# Patient Record
Sex: FEMALE | Race: WHITE | NOT HISPANIC OR LATINO | Employment: UNEMPLOYED | URBAN - NONMETROPOLITAN AREA
[De-identification: names, ages, dates, MRNs, and addresses within clinical notes are randomized per-mention and may not be internally consistent; named-entity substitution may affect disease eponyms.]

---
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
## 2023-11-30 LAB
ABO (EXTERNAL): NORMAL
APPEARANCE UR: CLEAR
BILIRUB UR QL: NORMAL
BLOOD URINE (EXTERNAL): NORMAL
C TRACH DNA SPEC QL PROBE+SIG AMP: NOT DETECTED
COLOR UR: YELLOW
DIFFERENTIAL: NORMAL
ERYTHROCYTE [DISTWIDTH] IN BLOOD BY AUTOMATED COUNT: 12.4 % (ref 11.3–14.6)
GLUCOSE UR STRIP-MCNC: NORMAL MG/DL
HEMATOCRIT (EXTERNAL): 38 % (ref 34.3–46)
HEMOGLOBIN (EXTERNAL): 12.6 G/DL (ref 11.2–15.5)
HEPATITIS B SURFACE ANTIGEN (EXTERNAL): NONREACTIVE
HEPATITIS C ANTIBODY (EXTERNAL): NONREACTIVE
HIV1+2 AB SERPL QL IA: NONREACTIVE
KETONES UR QL STRIP: NORMAL
LEUKOCYTE ESTERASE URINE (EXTERNAL): NORMAL
MCH RBC QN AUTO: 30.7 PG (ref 26.7–33.1)
MCHC RBC AUTO-ENTMCNC: 33.2 G/DL (ref 31.6–35.5)
MCV RBC AUTO: 92.7 FL (ref 81.4–99)
N GONORRHOEA DNA SPEC QL PROBE+SIG AMP: NOT DETECTED
NITRITES URINE (EXTERNAL): NORMAL
PH UR: 5.5 [PH]
PLATELET COUNT (EXTERNAL): 256 10E3/UL (ref 130–375)
PROTEIN ALBUMIN UR (EXTERNAL): NORMAL
RBC # BLD AUTO: 4.1 10E6/UL (ref 3.77–5.24)
RH (EXTERNAL): POSITIVE
RUBELLA ANTIBODY IGG (EXTERNAL): POSITIVE
SOURCE: (EXTERNAL): NORMAL
SPECIFIC GRAVITY URINE (EXTERNAL): 1.02
SPECIMEN DESCRIP: NORMAL
SPECIMEN DESCRIPTION: NORMAL
TREPONEMA PALLIDUM ANTIBODY (EXTERNAL): NONREACTIVE
UROBILINOGEN (EXTERNAL): NORMAL
WBC COUNT (EXTERNAL): 8.4 10E3/UL (ref 3.2–11)

## 2024-02-01 ENCOUNTER — PRENATAL OFFICE VISIT (OUTPATIENT)
Dept: OBGYN | Facility: OTHER | Age: 24
End: 2024-02-01
Attending: OBSTETRICS & GYNECOLOGY
Payer: COMMERCIAL

## 2024-02-01 VITALS
BODY MASS INDEX: 25.87 KG/M2 | HEIGHT: 63 IN | OXYGEN SATURATION: 98 % | SYSTOLIC BLOOD PRESSURE: 110 MMHG | HEART RATE: 75 BPM | WEIGHT: 146 LBS | DIASTOLIC BLOOD PRESSURE: 68 MMHG

## 2024-02-01 DIAGNOSIS — Z34.92 NORMAL PREGNANCY, SECOND TRIMESTER: Primary | ICD-10-CM

## 2024-02-01 PROCEDURE — 99207 PR PRENATAL VISIT: CPT | Performed by: OBSTETRICS & GYNECOLOGY

## 2024-02-01 RX ORDER — CHOLECALCIFEROL (VITAMIN D3) 25 MCG
1 TABLET,CHEWABLE ORAL DAILY
COMMUNITY
Start: 2023-11-09

## 2024-02-01 ASSESSMENT — PAIN SCALES - GENERAL: PAINLEVEL: NO PAIN (0)

## 2024-02-01 NOTE — PROGRESS NOTES
Doing well.  No concerns today.  OB transfer of care from [place].  Lives in [place] and works at [employer] in [place].  Reviewed prenatal records/labs/imaging.  Nml NIPt.  Concordant dates.    US for full fetal anatomical survey ordered.  Return to clinic in 4 weeks    Tye Fierro MD  2/1/2024

## 2024-03-01 ENCOUNTER — HOSPITAL ENCOUNTER (OUTPATIENT)
Dept: ULTRASOUND IMAGING | Facility: HOSPITAL | Age: 24
Discharge: HOME | End: 2024-03-01
Attending: OBSTETRICS & GYNECOLOGY
Payer: COMMERCIAL

## 2024-03-01 ENCOUNTER — PRENATAL OFFICE VISIT (OUTPATIENT)
Dept: OBGYN | Facility: OTHER | Age: 24
End: 2024-03-01
Attending: OBSTETRICS & GYNECOLOGY
Payer: COMMERCIAL

## 2024-03-01 VITALS — WEIGHT: 149 LBS | SYSTOLIC BLOOD PRESSURE: 118 MMHG | BODY MASS INDEX: 26.39 KG/M2 | DIASTOLIC BLOOD PRESSURE: 78 MMHG

## 2024-03-01 DIAGNOSIS — Z34.92 NORMAL PREGNANCY, SECOND TRIMESTER: Primary | ICD-10-CM

## 2024-03-01 DIAGNOSIS — Z34.92 NORMAL PREGNANCY, SECOND TRIMESTER: ICD-10-CM

## 2024-03-01 PROCEDURE — 99207 PR PRENATAL VISIT: CPT | Performed by: OBSTETRICS & GYNECOLOGY

## 2024-03-01 PROCEDURE — 76805 OB US >/= 14 WKS SNGL FETUS: CPT

## 2024-03-01 ASSESSMENT — PAIN SCALES - GENERAL: PAINLEVEL: NO PAIN (0)

## 2024-03-01 NOTE — PROGRESS NOTES
Doing well.  No concerns today.    Denies PTL sx, vb, lof  Reviewed  today's US-no concerns with anatomical survey.  Return to clinic in 4 weeks    Tye Fierro MD  3/1/2024

## 2024-03-05 ENCOUNTER — DOCUMENTATION ONLY (OUTPATIENT)
Dept: OTHER | Facility: CLINIC | Age: 24
End: 2024-03-05

## 2024-03-29 ENCOUNTER — PRENATAL OFFICE VISIT (OUTPATIENT)
Dept: OBGYN | Facility: OTHER | Age: 24
End: 2024-03-29
Attending: NURSE PRACTITIONER
Payer: COMMERCIAL

## 2024-03-29 VITALS
RESPIRATION RATE: 16 BRPM | WEIGHT: 152.8 LBS | BODY MASS INDEX: 27.07 KG/M2 | DIASTOLIC BLOOD PRESSURE: 58 MMHG | SYSTOLIC BLOOD PRESSURE: 110 MMHG | OXYGEN SATURATION: 99 % | HEIGHT: 63 IN | HEART RATE: 95 BPM

## 2024-03-29 DIAGNOSIS — Z34.92 NORMAL PREGNANCY, SECOND TRIMESTER: Primary | ICD-10-CM

## 2024-03-29 PROCEDURE — 99207 PR PRENATAL VISIT: CPT | Performed by: NURSE PRACTITIONER

## 2024-03-29 ASSESSMENT — PAIN SCALES - GENERAL: PAINLEVEL: NO PAIN (0)

## 2024-03-29 NOTE — PROGRESS NOTES
Doing well.  Baby active.  No cramping or bleeding.  Discussed and ordered 28 week labs for next visit.  Plan Tdap and teaching next visit.  Changes and comfort measures discussed.  Return in 4 weeks.

## 2024-04-01 DIAGNOSIS — Z34.92 NORMAL PREGNANCY, SECOND TRIMESTER: Primary | ICD-10-CM

## 2024-04-04 DIAGNOSIS — Z34.92 NORMAL PREGNANCY, SECOND TRIMESTER: Primary | ICD-10-CM

## 2024-04-12 ENCOUNTER — LAB (OUTPATIENT)
Dept: LAB | Facility: OTHER | Age: 24
End: 2024-04-12
Attending: NURSE PRACTITIONER
Payer: COMMERCIAL

## 2024-04-12 ENCOUNTER — PRENATAL OFFICE VISIT (OUTPATIENT)
Dept: OBGYN | Facility: OTHER | Age: 24
End: 2024-04-12
Attending: NURSE PRACTITIONER
Payer: COMMERCIAL

## 2024-04-12 VITALS
DIASTOLIC BLOOD PRESSURE: 60 MMHG | HEIGHT: 64 IN | SYSTOLIC BLOOD PRESSURE: 120 MMHG | OXYGEN SATURATION: 99 % | HEART RATE: 76 BPM | BODY MASS INDEX: 26.8 KG/M2 | WEIGHT: 157 LBS

## 2024-04-12 DIAGNOSIS — Z34.92 NORMAL PREGNANCY, SECOND TRIMESTER: ICD-10-CM

## 2024-04-12 DIAGNOSIS — Z23 ENCOUNTER FOR ADMINISTRATION OF VACCINE: ICD-10-CM

## 2024-04-12 LAB — GLUCOSE 1H P 50 G GLC PO SERPL-MCNC: 104 MG/DL (ref 70–129)

## 2024-04-12 PROCEDURE — 90715 TDAP VACCINE 7 YRS/> IM: CPT | Performed by: NURSE PRACTITIONER

## 2024-04-12 PROCEDURE — 82950 GLUCOSE TEST: CPT

## 2024-04-12 PROCEDURE — 36415 COLL VENOUS BLD VENIPUNCTURE: CPT

## 2024-04-12 PROCEDURE — 99207 PR PRENATAL VISIT: CPT | Performed by: NURSE PRACTITIONER

## 2024-04-12 PROCEDURE — 90471 IMMUNIZATION ADMIN: CPT | Performed by: NURSE PRACTITIONER

## 2024-04-12 ASSESSMENT — PAIN SCALES - GENERAL: PAINLEVEL: NO PAIN (0)

## 2024-04-12 ASSESSMENT — ANXIETY QUESTIONNAIRES
GAD7 TOTAL SCORE: 0
5. BEING SO RESTLESS THAT IT IS HARD TO SIT STILL: NOT AT ALL
GAD7 TOTAL SCORE: 0
IF YOU CHECKED OFF ANY PROBLEMS ON THIS QUESTIONNAIRE, HOW DIFFICULT HAVE THESE PROBLEMS MADE IT FOR YOU TO DO YOUR WORK, TAKE CARE OF THINGS AT HOME, OR GET ALONG WITH OTHER PEOPLE: NOT DIFFICULT AT ALL
3. WORRYING TOO MUCH ABOUT DIFFERENT THINGS: NOT AT ALL
1. FEELING NERVOUS, ANXIOUS, OR ON EDGE: NOT AT ALL
6. BECOMING EASILY ANNOYED OR IRRITABLE: NOT AT ALL
2. NOT BEING ABLE TO STOP OR CONTROL WORRYING: NOT AT ALL
7. FEELING AFRAID AS IF SOMETHING AWFUL MIGHT HAPPEN: NOT AT ALL

## 2024-04-12 ASSESSMENT — PATIENT HEALTH QUESTIONNAIRE - PHQ9
5. POOR APPETITE OR OVEREATING: NOT AT ALL
SUM OF ALL RESPONSES TO PHQ QUESTIONS 1-9: 2

## 2024-04-12 NOTE — PROGRESS NOTES
Denies concerns today.  Baby active.  No bleeding or cramping. 28 week labs, Tdap, and education today.  Discussed BH, kick counts, and signs of PTL.  Return in 2 weeks.

## 2024-04-12 NOTE — PROGRESS NOTES
Writer reviewed the following education points/handouts during Second Trimester Appointment.   Tdap   Lactation Handout  Fetal Kick Counts  Breast Pump Resources   Circumcision [place] Handout  Breastfeeding Booklet: Benefits of Breastfeeding, skin-to-skin and rooming in, types of breast milk, feeding cues, approximate baby's tummy size, breastfeeding positions, signs of a good latch, cluster feeding, sore nipples, engorgement, spouse support.  All questions answered. Writer encouraged patient to call with any concerns or questions in the future.     Alexandria Sutton RN on 4/12/2024 at 10:46 AM

## 2024-04-25 ENCOUNTER — LAB (OUTPATIENT)
Dept: LAB | Facility: OTHER | Age: 24
End: 2024-04-25
Attending: OBSTETRICS & GYNECOLOGY
Payer: COMMERCIAL

## 2024-04-25 ENCOUNTER — PRENATAL OFFICE VISIT (OUTPATIENT)
Dept: OBGYN | Facility: OTHER | Age: 24
End: 2024-04-25
Attending: OBSTETRICS & GYNECOLOGY
Payer: COMMERCIAL

## 2024-04-25 VITALS
HEART RATE: 73 BPM | BODY MASS INDEX: 28 KG/M2 | WEIGHT: 158 LBS | SYSTOLIC BLOOD PRESSURE: 100 MMHG | DIASTOLIC BLOOD PRESSURE: 54 MMHG | OXYGEN SATURATION: 100 % | HEIGHT: 63 IN

## 2024-04-25 DIAGNOSIS — Z34.03 PREGNANCY, SUPERVISION OF FIRST, THIRD TRIMESTER: Primary | ICD-10-CM

## 2024-04-25 DIAGNOSIS — Z34.92 NORMAL PREGNANCY, SECOND TRIMESTER: ICD-10-CM

## 2024-04-25 LAB
ANTIBODY SCREEN: NEGATIVE
ERYTHROCYTE [DISTWIDTH] IN BLOOD BY AUTOMATED COUNT: 13 % (ref 10–15)
HCT VFR BLD AUTO: 32.8 % (ref 35–47)
HGB BLD-MCNC: 11.1 G/DL (ref 11.7–15.7)
MCH RBC QN AUTO: 31.3 PG (ref 26.5–33)
MCHC RBC AUTO-ENTMCNC: 33.8 G/DL (ref 31.5–36.5)
MCV RBC AUTO: 92 FL (ref 78–100)
PLATELET # BLD AUTO: 234 10E3/UL (ref 150–450)
RBC # BLD AUTO: 3.55 10E6/UL (ref 3.8–5.2)
SPECIMEN EXPIRATION DATE: NORMAL
T PALLIDUM AB SER QL: NONREACTIVE
WBC # BLD AUTO: 7.3 10E3/UL (ref 4–11)

## 2024-04-25 PROCEDURE — 36415 COLL VENOUS BLD VENIPUNCTURE: CPT

## 2024-04-25 PROCEDURE — 85027 COMPLETE CBC AUTOMATED: CPT

## 2024-04-25 PROCEDURE — 99207 PR PRENATAL VISIT: CPT | Performed by: OBSTETRICS & GYNECOLOGY

## 2024-04-25 PROCEDURE — 86780 TREPONEMA PALLIDUM: CPT

## 2024-04-25 PROCEDURE — 86850 RBC ANTIBODY SCREEN: CPT

## 2024-04-25 ASSESSMENT — PAIN SCALES - GENERAL: PAINLEVEL: MILD PAIN (3)

## 2024-04-25 NOTE — PROGRESS NOTES
Doing well.  No concerns today.  Prenatal flowsheet information is reviewed.  Discussed kick counts and fetal movement.  RTC in 2 weeks  Denies PTL devon, rika, lof  Tye Fierro MD  4/25/2024

## 2024-05-09 ENCOUNTER — PRENATAL OFFICE VISIT (OUTPATIENT)
Dept: OBGYN | Facility: OTHER | Age: 24
End: 2024-05-09
Attending: OBSTETRICS & GYNECOLOGY
Payer: COMMERCIAL

## 2024-05-09 VITALS
HEART RATE: 77 BPM | SYSTOLIC BLOOD PRESSURE: 116 MMHG | BODY MASS INDEX: 27.66 KG/M2 | WEIGHT: 156.1 LBS | OXYGEN SATURATION: 99 % | HEIGHT: 63 IN | DIASTOLIC BLOOD PRESSURE: 60 MMHG

## 2024-05-09 DIAGNOSIS — Z34.03 PREGNANCY, SUPERVISION OF FIRST, THIRD TRIMESTER: Primary | ICD-10-CM

## 2024-05-09 PROCEDURE — 99207 PR PRENATAL VISIT: CPT | Performed by: OBSTETRICS & GYNECOLOGY

## 2024-05-09 ASSESSMENT — PAIN SCALES - GENERAL: PAINLEVEL: NO PAIN (0)

## 2024-05-09 NOTE — PROGRESS NOTES
Doing well.  No concerns today.  Prenatal flowsheet information is reviewed.  Discussed kick counts and fetal movement.  Reportable signs and symptoms discussed.  RTC in 2 weeks  Denies PTL devon, rika, giselle Fierro MD  5/9/2024

## 2024-05-23 ENCOUNTER — PRENATAL OFFICE VISIT (OUTPATIENT)
Dept: OBGYN | Facility: OTHER | Age: 24
End: 2024-05-23
Attending: NURSE PRACTITIONER
Payer: COMMERCIAL

## 2024-05-23 VITALS — SYSTOLIC BLOOD PRESSURE: 114 MMHG | DIASTOLIC BLOOD PRESSURE: 62 MMHG | BODY MASS INDEX: 27.99 KG/M2 | WEIGHT: 158 LBS

## 2024-05-23 DIAGNOSIS — Z34.03 PREGNANCY, SUPERVISION OF FIRST, THIRD TRIMESTER: Primary | ICD-10-CM

## 2024-05-23 PROCEDURE — 99207 PR PRENATAL VISIT: CPT | Performed by: NURSE PRACTITIONER

## 2024-05-23 RX ORDER — CALCIUM CARBONATE 750 MG/1
750 TABLET, CHEWABLE ORAL DAILY
COMMUNITY

## 2024-05-23 ASSESSMENT — PAIN SCALES - GENERAL: PAINLEVEL: NO PAIN (0)

## 2024-05-23 NOTE — PROGRESS NOTES
Doing well.  Denies concerns.  No cramping or chava.  Reviewed signs of PTL and kick counts.  Comfort measures discussed.  Return in 2 weeks.

## 2024-06-06 ENCOUNTER — PRENATAL OFFICE VISIT (OUTPATIENT)
Dept: OBGYN | Facility: OTHER | Age: 24
End: 2024-06-06
Attending: NURSE PRACTITIONER
Payer: COMMERCIAL

## 2024-06-06 VITALS — DIASTOLIC BLOOD PRESSURE: 62 MMHG | BODY MASS INDEX: 28.34 KG/M2 | SYSTOLIC BLOOD PRESSURE: 110 MMHG | WEIGHT: 160 LBS

## 2024-06-06 DIAGNOSIS — Z34.03 PREGNANCY, SUPERVISION OF FIRST, THIRD TRIMESTER: Primary | ICD-10-CM

## 2024-06-06 PROCEDURE — 99207 PR PRENATAL VISIT: CPT | Performed by: NURSE PRACTITIONER

## 2024-06-06 ASSESSMENT — PAIN SCALES - GENERAL: PAINLEVEL: NO PAIN (0)

## 2024-06-07 NOTE — PROGRESS NOTES
No concerns today.  Occasional BH but resolve with rest.  Denies vb, leaking, or pain..  Plan GBS next visit. RETURN TO CLINIC in 1 week.

## 2024-06-13 ENCOUNTER — PRENATAL OFFICE VISIT (OUTPATIENT)
Dept: OBGYN | Facility: OTHER | Age: 24
End: 2024-06-13
Attending: NURSE PRACTITIONER
Payer: COMMERCIAL

## 2024-06-13 VITALS
HEART RATE: 79 BPM | SYSTOLIC BLOOD PRESSURE: 118 MMHG | OXYGEN SATURATION: 100 % | WEIGHT: 163.7 LBS | HEIGHT: 63 IN | BODY MASS INDEX: 29 KG/M2 | DIASTOLIC BLOOD PRESSURE: 60 MMHG

## 2024-06-13 DIAGNOSIS — Z34.93 NORMAL PREGNANCY, THIRD TRIMESTER: Primary | ICD-10-CM

## 2024-06-13 PROCEDURE — 87653 STREP B DNA AMP PROBE: CPT | Performed by: NURSE PRACTITIONER

## 2024-06-13 PROCEDURE — 99207 PR PRENATAL VISIT: CPT | Performed by: NURSE PRACTITIONER

## 2024-06-13 ASSESSMENT — PAIN SCALES - GENERAL: PAINLEVEL: NO PAIN (0)

## 2024-06-14 LAB — GP B STREP DNA SPEC QL NAA+PROBE: NEGATIVE

## 2024-06-14 NOTE — PROGRESS NOTES
Doing well.  No concerns today.  GBS done.  Denies vb,leaking, or decreased fetal movement. Reviewed late pregnancy changes and signs of labor.  Return in 1 week.

## 2024-06-20 ENCOUNTER — PRENATAL OFFICE VISIT (OUTPATIENT)
Dept: OBGYN | Facility: OTHER | Age: 24
End: 2024-06-20
Attending: NURSE PRACTITIONER
Payer: COMMERCIAL

## 2024-06-20 VITALS
HEART RATE: 96 BPM | HEIGHT: 63 IN | SYSTOLIC BLOOD PRESSURE: 108 MMHG | BODY MASS INDEX: 29.07 KG/M2 | WEIGHT: 164.1 LBS | OXYGEN SATURATION: 100 % | DIASTOLIC BLOOD PRESSURE: 64 MMHG

## 2024-06-20 DIAGNOSIS — Z34.93 NORMAL PREGNANCY, THIRD TRIMESTER: Primary | ICD-10-CM

## 2024-06-20 PROCEDURE — 99207 PR PRENATAL VISIT: CPT | Performed by: NURSE PRACTITIONER

## 2024-06-20 ASSESSMENT — PAIN SCALES - GENERAL: PAINLEVEL: NO PAIN (0)

## 2024-06-20 NOTE — PROGRESS NOTES
Doing well.  No concerns.  Cephalic.  GBS neg.  Reviewed signs of labor and when to come in.  Discussed PPD and PP follow up.  36 week teaching completed.  Return in 1 week.

## 2024-06-27 ENCOUNTER — PRENATAL OFFICE VISIT (OUTPATIENT)
Dept: OBGYN | Facility: OTHER | Age: 24
End: 2024-06-27
Attending: OBSTETRICS & GYNECOLOGY
Payer: COMMERCIAL

## 2024-06-27 VITALS
DIASTOLIC BLOOD PRESSURE: 60 MMHG | WEIGHT: 168.9 LBS | BODY MASS INDEX: 29.93 KG/M2 | HEIGHT: 63 IN | OXYGEN SATURATION: 99 % | HEART RATE: 84 BPM | RESPIRATION RATE: 16 BRPM | SYSTOLIC BLOOD PRESSURE: 128 MMHG

## 2024-06-27 DIAGNOSIS — Z34.93 NORMAL PREGNANCY, THIRD TRIMESTER: Primary | ICD-10-CM

## 2024-06-27 PROCEDURE — 99207 PR PRENATAL VISIT: CPT | Performed by: OBSTETRICS & GYNECOLOGY

## 2024-06-27 ASSESSMENT — PAIN SCALES - GENERAL: PAINLEVEL: MILD PAIN (2)

## 2024-06-27 NOTE — PROGRESS NOTES
Doing well.  No concerns today.  Discussed signs of labor and when to call or come in.  Discussed kick counts and fetal movement.  She will report to OB if contractions every 5-10 minutes or if rupture of membranes.  RTC in 1 week  Denies regular contractions, vaginal bleeding, JOSE A Fierro MD  6/27/2024

## 2024-07-05 ENCOUNTER — PRENATAL OFFICE VISIT (OUTPATIENT)
Dept: OBGYN | Facility: OTHER | Age: 24
End: 2024-07-05
Attending: NURSE PRACTITIONER
Payer: COMMERCIAL

## 2024-07-05 VITALS — WEIGHT: 162 LBS | SYSTOLIC BLOOD PRESSURE: 120 MMHG | DIASTOLIC BLOOD PRESSURE: 68 MMHG | BODY MASS INDEX: 28.7 KG/M2

## 2024-07-05 DIAGNOSIS — Z34.93 NORMAL PREGNANCY, THIRD TRIMESTER: Primary | ICD-10-CM

## 2024-07-05 PROCEDURE — 99207 PR PRENATAL VISIT: CPT | Performed by: NURSE PRACTITIONER

## 2024-07-05 ASSESSMENT — PAIN SCALES - GENERAL: PAINLEVEL: NO PAIN (0)

## 2024-07-05 NOTE — PROGRESS NOTES
Doing well.  Occasional BH.  No vb, leaking, or regular cxt.  Signs of labor reviewed and when to come in.  Return in 1 week.  
Offered and patient declined

## 2024-07-08 ENCOUNTER — HOSPITAL ENCOUNTER (OUTPATIENT)
Facility: HOSPITAL | Age: 24
Discharge: HOME | End: 2024-07-08
Attending: OBSTETRICS & GYNECOLOGY | Admitting: OBSTETRICS & GYNECOLOGY
Payer: COMMERCIAL

## 2024-07-08 ENCOUNTER — NURSE TRIAGE (OUTPATIENT)
Dept: OBGYN | Facility: OTHER | Age: 24
End: 2024-07-08

## 2024-07-08 VITALS
HEIGHT: 63 IN | DIASTOLIC BLOOD PRESSURE: 73 MMHG | HEART RATE: 87 BPM | WEIGHT: 162 LBS | BODY MASS INDEX: 28.7 KG/M2 | SYSTOLIC BLOOD PRESSURE: 128 MMHG | TEMPERATURE: 98.4 F | RESPIRATION RATE: 16 BRPM | OXYGEN SATURATION: 99 %

## 2024-07-08 PROBLEM — Z36.89 ENCOUNTER FOR TRIAGE IN PREGNANT PATIENT: Status: ACTIVE | Noted: 2024-07-08

## 2024-07-08 LAB
AMPHETAMINES UR QL SCN: ABNORMAL
BARBITURATES UR QL SCN: ABNORMAL
BENZODIAZ UR QL SCN: ABNORMAL
BZE UR QL SCN: ABNORMAL
CANNABINOIDS UR QL SCN: ABNORMAL
CREAT UR-MCNC: 23 MG/DL
FENTANYL UR QL: ABNORMAL
OPIATES UR QL SCN: ABNORMAL
PCP QUAL URINE (ROCHE): ABNORMAL
RUPTURE OF FETAL MEMBRANES BY ROM PLUS: NEGATIVE

## 2024-07-08 PROCEDURE — 80307 DRUG TEST PRSMV CHEM ANLYZR: CPT | Performed by: OBSTETRICS & GYNECOLOGY

## 2024-07-08 PROCEDURE — G0463 HOSPITAL OUTPT CLINIC VISIT: HCPCS

## 2024-07-08 PROCEDURE — 84112 EVAL AMNIOTIC FLUID PROTEIN: CPT | Performed by: OBSTETRICS & GYNECOLOGY

## 2024-07-08 PROCEDURE — 80349 CANNABINOIDS NATURAL: CPT | Performed by: OBSTETRICS & GYNECOLOGY

## 2024-07-08 RX ORDER — LIDOCAINE 40 MG/G
CREAM TOPICAL
Status: DISCONTINUED | OUTPATIENT
Start: 2024-07-08 | End: 2024-07-08 | Stop reason: HOSPADM

## 2024-07-08 ASSESSMENT — ACTIVITIES OF DAILY LIVING (ADL)
ADLS_ACUITY_SCORE: 35
ADLS_ACUITY_SCORE: 18
ADLS_ACUITY_SCORE: 18

## 2024-07-08 NOTE — PLAN OF CARE
Face to face report given with opportunity to observe patient.    Report given to Ila Srinivasan RN   7/8/2024  6:25 PM

## 2024-07-08 NOTE — PLAN OF CARE
"Data: [facility] presented [facility]: 2024  6:00 PM.  Reason for maternal/fetal assessment is uterine contractions and leaking vaginal fluid. Patient reports she has been having contractions since 5 pm yesterday. She states \" I contract/ cramp for 20 minutes every hour and then they go away\".  Reports having damp underwear since 0300 but unsure if her water broke. Some pink blood noted at 0300. Patient denies nausea, vomiting, headache, visual disturbances, epigastric or RUQ pain, significant edema. Patient reports fetal movement is normal. Patient is a 40w1d .  Prenatal record reviewed. Pregnancy has been complicated by THC use .    Vital signs wnl. Support person is present.     Action: Verbal consent for EFM. Triage assessment completed.     Response: Patient verbalized agreement with plan. Will contact Dr. Lino with update and further orders.          "

## 2024-07-08 NOTE — TELEPHONE ENCOUNTER
"Reason for Disposition   Leakage of fluid from vagina     Pt unsure - reports move \"wetness\" present with using the bathroom since 0300 this morning. Clear in color.    Additional Information   Negative: Passed out (i.e., fainted, collapsed and was not responding)   Negative: Shock suspected (e.g., cold/pale/clammy skin, too weak to stand, low BP, rapid pulse)   Negative: Difficult to awaken or acting confused (e.g., disoriented, slurred speech)   Negative: SEVERE constant abdominal pain (e.g., excruciating) and present > 1 hour   Negative: SEVERE bleeding (e.g., continuous red blood from vagina, or large blood clots)   Negative: Umbilical cord hanging out of the vagina (shiny, white, curled appearance, \"like telephone cord\")   Negative: Uncontrollable urge to push (i.e., feels like baby is coming out now)   Negative: Can see baby   Negative: Sounds like a life-threatening emergency to the triager   Negative: Pregnant < 37 weeks (i.e., )   Negative: Uncertain delivery date and possibly pregnant < 37 weeks (i.e., )   Negative: MODERATE-SEVERE abdominal pain   Negative: First baby (primipara) with contractions < 6 minutes apart, and present 2 hours   Negative: History of prior delivery (multipara) with contractions < 10 minutes apart, and present 1 hour   Negative: History of rapid prior delivery with contractions < 10 minutes apart   Negative: Leakage of fluid from vagina and green or brown in color    Answer Assessment - Initial Assessment Questions  1. ONSET: \"When did the symptoms begin?\"         Last night at 1700 - more frequent the last 2 hours. Reports contractions every 20 min.   2. CONTRACTIONS: \"Describe the contractions that you are having.\" (e.g., duration, frequency, regularity, severity)      Last 2 hours every 10 min. States belly gets tight and has low back pain with them. Rates 3/10 for pain.   3. PREGNANCY: \"How many weeks pregnant are you?\"      40w 1d  4. MONAE: \"What date are you " "expecting to deliver?\"      7/7/24  5. PARITY: \"Have you had a baby before?\" If Yes, ask: \"How long did the labor last?\"      First baby.   6. FETAL MOVEMENT: \"Has the baby's movement decreased or changed significantly from normal?\"      Babe active and normal movement.  7. OTHER SYMPTOMS: \"Do you have any other symptoms?\" (e.g., leaking fluid from vagina, vaginal bleeding, fever, hand/facial swelling)      More \"wetness\" noted when using the bathroom since 0300 this am.Noted mucus when using the bathroom also this morning. No vaginal bleeding. No UTI symptoms, reports no complications with pregnancy and states that she has some vaginal pressure.    Protocols used: Pregnancy - Labor-A-OH    Pt agreeable to come and be triaged. States she will come in at 1730 when boyfriend gets home from work. Educated pt to come in immediately if things change or worsen - vaginal bleeding, decreased fetal movement, more intense contractions, alteration metal status. Pt states understanding.    "

## 2024-07-09 ENCOUNTER — PRENATAL OFFICE VISIT (OUTPATIENT)
Dept: OBGYN | Facility: OTHER | Age: 24
End: 2024-07-09
Attending: OBSTETRICS & GYNECOLOGY
Payer: COMMERCIAL

## 2024-07-09 VITALS
HEART RATE: 100 BPM | OXYGEN SATURATION: 99 % | SYSTOLIC BLOOD PRESSURE: 120 MMHG | BODY MASS INDEX: 28.61 KG/M2 | DIASTOLIC BLOOD PRESSURE: 70 MMHG | WEIGHT: 161.5 LBS

## 2024-07-09 DIAGNOSIS — Z34.93 NORMAL PREGNANCY, THIRD TRIMESTER: Primary | ICD-10-CM

## 2024-07-09 PROCEDURE — 99207 PR PRENATAL VISIT: CPT | Performed by: OBSTETRICS & GYNECOLOGY

## 2024-07-09 ASSESSMENT — PAIN SCALES - GENERAL: PAINLEVEL: NO PAIN (0)

## 2024-07-09 NOTE — PROGRESS NOTES
Dr. Lino notified at 2026 that SVE unchanged from previous check, pt reports pain slightly increased with contractions, and pt reports feeling comfortable going home at this time.       Received orders per Dr. Lino to discharge pt at this time.

## 2024-07-09 NOTE — PLAN OF CARE
Dr Lino called and updated of pt's arrival, SVE and ROM plus negative.  Plan to recheck cervix in 1 hr and update MD after.  Oncoming RN and pt aware of the plan.   Face to face report given with opportunity to observe patient.    Report given to Autumn Lund RN   7/8/2024  7:31 PM

## 2024-07-09 NOTE — PROGRESS NOTES
Discharge:    Discharge instructions provided to pt. Pt instructed to return if experiencing any vaginal bleeding or leaking of fluid, decreased fetal movement, contractions increase in intensity and/or frequency, preeclampsia symptoms(symptoms reviewed with pt.) or any other concerns. All pt questions answered at this time. Pt denies having any additional questions or concerns and reports feeling comfortable going home at this time.     Pt discharged at 2034 accompanied by significant other.

## 2024-07-09 NOTE — PROGRESS NOTES
Doing well.  No concerns today.  Was in L and D yesterday for labor check, 3cm, neg ROM eval.  Having mild short ctx Q 5-10 min.   Cvx 3/80/-2 vtx.  Membranes stripped with consent.   Discussed signs of labor and when to call or come in.  Discussed kick counts and fetal movement.  Reportable signs and symptoms discussed.  RTC in 1 week  Denies regular contractions, vaginal bleeding, LOF  Tye Fierro MD  7/9/2024

## 2024-07-10 ENCOUNTER — ANESTHESIA (OUTPATIENT)
Dept: OBGYN | Facility: HOSPITAL | Age: 24
End: 2024-07-10
Payer: COMMERCIAL

## 2024-07-10 ENCOUNTER — ANESTHESIA EVENT (OUTPATIENT)
Dept: OBGYN | Facility: HOSPITAL | Age: 24
End: 2024-07-10
Payer: COMMERCIAL

## 2024-07-10 ENCOUNTER — HOSPITAL ENCOUNTER (INPATIENT)
Facility: HOSPITAL | Age: 24
LOS: 1 days | Discharge: HOME | End: 2024-07-11
Attending: OBSTETRICS & GYNECOLOGY | Admitting: OBSTETRICS & GYNECOLOGY
Payer: COMMERCIAL

## 2024-07-10 PROBLEM — Z37.9 NORMAL LABOR: Status: ACTIVE | Noted: 2024-07-10

## 2024-07-10 LAB
ABO/RH(D): NORMAL
AMPHETAMINES UR QL SCN: ABNORMAL
ANTIBODY SCREEN: NEGATIVE
BARBITURATES UR QL SCN: ABNORMAL
BENZODIAZ UR QL SCN: ABNORMAL
BZE UR QL SCN: ABNORMAL
CANNABINOIDS UR QL SCN: ABNORMAL
CREAT UR-MCNC: 143 MG/DL
ERYTHROCYTE [DISTWIDTH] IN BLOOD BY AUTOMATED COUNT: 14 % (ref 10–15)
FENTANYL UR QL: ABNORMAL
HCT VFR BLD AUTO: 34.1 % (ref 35–47)
HGB BLD-MCNC: 10.9 G/DL (ref 11.7–15.7)
HOLD SPECIMEN: NORMAL
MCH RBC QN AUTO: 28 PG (ref 26.5–33)
MCHC RBC AUTO-ENTMCNC: 32 G/DL (ref 31.5–36.5)
MCV RBC AUTO: 88 FL (ref 78–100)
OPIATES UR QL SCN: ABNORMAL
PCP QUAL URINE (ROCHE): ABNORMAL
PLATELET # BLD AUTO: 287 10E3/UL (ref 150–450)
RBC # BLD AUTO: 3.89 10E6/UL (ref 3.8–5.2)
RUPTURE OF FETAL MEMBRANES BY ROM PLUS: POSITIVE
SPECIMEN EXPIRATION DATE: NORMAL
T PALLIDUM AB SER QL: NONREACTIVE
WBC # BLD AUTO: 10.2 10E3/UL (ref 4–11)

## 2024-07-10 PROCEDURE — 84112 EVAL AMNIOTIC FLUID PROTEIN: CPT | Performed by: OBSTETRICS & GYNECOLOGY

## 2024-07-10 PROCEDURE — 80307 DRUG TEST PRSMV CHEM ANLYZR: CPT | Performed by: OBSTETRICS & GYNECOLOGY

## 2024-07-10 PROCEDURE — 59400 OBSTETRICAL CARE: CPT | Performed by: NURSE ANESTHETIST, CERTIFIED REGISTERED

## 2024-07-10 PROCEDURE — 250N000011 HC RX IP 250 OP 636: Performed by: NURSE ANESTHETIST, CERTIFIED REGISTERED

## 2024-07-10 PROCEDURE — 722N000001 HC LABOR CARE VAGINAL DELIVERY SINGLE

## 2024-07-10 PROCEDURE — 258N000003 HC RX IP 258 OP 636: Performed by: OBSTETRICS & GYNECOLOGY

## 2024-07-10 PROCEDURE — 250N000009 HC RX 250: Performed by: OBSTETRICS & GYNECOLOGY

## 2024-07-10 PROCEDURE — 36415 COLL VENOUS BLD VENIPUNCTURE: CPT | Performed by: OBSTETRICS & GYNECOLOGY

## 2024-07-10 PROCEDURE — 0UQMXZZ REPAIR VULVA, EXTERNAL APPROACH: ICD-10-PCS | Performed by: OBSTETRICS & GYNECOLOGY

## 2024-07-10 PROCEDURE — 59400 OBSTETRICAL CARE: CPT | Performed by: OBSTETRICS & GYNECOLOGY

## 2024-07-10 PROCEDURE — 86900 BLOOD TYPING SEROLOGIC ABO: CPT | Performed by: OBSTETRICS & GYNECOLOGY

## 2024-07-10 PROCEDURE — 250N000013 HC RX MED GY IP 250 OP 250 PS 637: Performed by: OBSTETRICS & GYNECOLOGY

## 2024-07-10 PROCEDURE — 80349 CANNABINOIDS NATURAL: CPT | Performed by: OBSTETRICS & GYNECOLOGY

## 2024-07-10 PROCEDURE — 85027 COMPLETE CBC AUTOMATED: CPT | Performed by: OBSTETRICS & GYNECOLOGY

## 2024-07-10 PROCEDURE — 250N000011 HC RX IP 250 OP 636: Performed by: OBSTETRICS & GYNECOLOGY

## 2024-07-10 PROCEDURE — 0UQGXZZ REPAIR VAGINA, EXTERNAL APPROACH: ICD-10-PCS | Performed by: OBSTETRICS & GYNECOLOGY

## 2024-07-10 PROCEDURE — 86780 TREPONEMA PALLIDUM: CPT | Performed by: OBSTETRICS & GYNECOLOGY

## 2024-07-10 PROCEDURE — 120N000001 HC R&B MED SURG/OB

## 2024-07-10 RX ORDER — METHYLERGONOVINE MALEATE 0.2 MG/ML
200 INJECTION INTRAVENOUS
Status: DISCONTINUED | OUTPATIENT
Start: 2024-07-10 | End: 2024-07-11 | Stop reason: HOSPADM

## 2024-07-10 RX ORDER — LOPERAMIDE HCL 2 MG
4 CAPSULE ORAL
Status: DISCONTINUED | OUTPATIENT
Start: 2024-07-10 | End: 2024-07-11 | Stop reason: HOSPADM

## 2024-07-10 RX ORDER — OXYTOCIN 10 [USP'U]/ML
10 INJECTION, SOLUTION INTRAMUSCULAR; INTRAVENOUS
Status: DISCONTINUED | OUTPATIENT
Start: 2024-07-10 | End: 2024-07-10 | Stop reason: HOSPADM

## 2024-07-10 RX ORDER — LOPERAMIDE HCL 2 MG
2 CAPSULE ORAL
Status: DISCONTINUED | OUTPATIENT
Start: 2024-07-10 | End: 2024-07-11 | Stop reason: HOSPADM

## 2024-07-10 RX ORDER — LOPERAMIDE HCL 2 MG
4 CAPSULE ORAL
Status: DISCONTINUED | OUTPATIENT
Start: 2024-07-10 | End: 2024-07-10 | Stop reason: HOSPADM

## 2024-07-10 RX ORDER — BISACODYL 10 MG
10 SUPPOSITORY, RECTAL RECTAL DAILY PRN
Status: DISCONTINUED | OUTPATIENT
Start: 2024-07-10 | End: 2024-07-11 | Stop reason: HOSPADM

## 2024-07-10 RX ORDER — FENTANYL CITRATE 50 UG/ML
100 INJECTION, SOLUTION INTRAMUSCULAR; INTRAVENOUS
Status: DISCONTINUED | OUTPATIENT
Start: 2024-07-10 | End: 2024-07-10 | Stop reason: HOSPADM

## 2024-07-10 RX ORDER — ONDANSETRON 4 MG/1
4 TABLET, ORALLY DISINTEGRATING ORAL EVERY 6 HOURS PRN
Status: DISCONTINUED | OUTPATIENT
Start: 2024-07-10 | End: 2024-07-10 | Stop reason: HOSPADM

## 2024-07-10 RX ORDER — NALBUPHINE HYDROCHLORIDE 10 MG/ML
2.5-5 INJECTION, SOLUTION INTRAMUSCULAR; INTRAVENOUS; SUBCUTANEOUS EVERY 6 HOURS PRN
Status: DISCONTINUED | OUTPATIENT
Start: 2024-07-10 | End: 2024-07-11 | Stop reason: HOSPADM

## 2024-07-10 RX ORDER — MISOPROSTOL 200 UG/1
400 TABLET ORAL
Status: DISCONTINUED | OUTPATIENT
Start: 2024-07-10 | End: 2024-07-11 | Stop reason: HOSPADM

## 2024-07-10 RX ORDER — ACETAMINOPHEN 325 MG/1
650 TABLET ORAL EVERY 4 HOURS PRN
Status: DISCONTINUED | OUTPATIENT
Start: 2024-07-10 | End: 2024-07-11 | Stop reason: HOSPADM

## 2024-07-10 RX ORDER — TRANEXAMIC ACID 10 MG/ML
1 INJECTION, SOLUTION INTRAVENOUS EVERY 30 MIN PRN
Status: DISCONTINUED | OUTPATIENT
Start: 2024-07-10 | End: 2024-07-11 | Stop reason: HOSPADM

## 2024-07-10 RX ORDER — IBUPROFEN 800 MG/1
800 TABLET, FILM COATED ORAL
Status: COMPLETED | OUTPATIENT
Start: 2024-07-10 | End: 2024-07-10

## 2024-07-10 RX ORDER — METHYLERGONOVINE MALEATE 0.2 MG/ML
200 INJECTION INTRAVENOUS
Status: DISCONTINUED | OUTPATIENT
Start: 2024-07-10 | End: 2024-07-10 | Stop reason: HOSPADM

## 2024-07-10 RX ORDER — FENTANYL CITRATE-0.9 % NACL/PF 10 MCG/ML
100 PLASTIC BAG, INJECTION (ML) INTRAVENOUS EVERY 5 MIN PRN
Status: DISCONTINUED | OUTPATIENT
Start: 2024-07-10 | End: 2024-07-10 | Stop reason: HOSPADM

## 2024-07-10 RX ORDER — DOCUSATE SODIUM 100 MG/1
100 CAPSULE, LIQUID FILLED ORAL DAILY
Status: DISCONTINUED | OUTPATIENT
Start: 2024-07-10 | End: 2024-07-11 | Stop reason: HOSPADM

## 2024-07-10 RX ORDER — OXYTOCIN 10 [USP'U]/ML
10 INJECTION, SOLUTION INTRAMUSCULAR; INTRAVENOUS
Status: DISCONTINUED | OUTPATIENT
Start: 2024-07-10 | End: 2024-07-11 | Stop reason: HOSPADM

## 2024-07-10 RX ORDER — KETOROLAC TROMETHAMINE 30 MG/ML
30 INJECTION, SOLUTION INTRAMUSCULAR; INTRAVENOUS
Status: COMPLETED | OUTPATIENT
Start: 2024-07-10 | End: 2024-07-10

## 2024-07-10 RX ORDER — NALOXONE HYDROCHLORIDE 0.4 MG/ML
0.2 INJECTION, SOLUTION INTRAMUSCULAR; INTRAVENOUS; SUBCUTANEOUS
Status: DISCONTINUED | OUTPATIENT
Start: 2024-07-10 | End: 2024-07-10 | Stop reason: HOSPADM

## 2024-07-10 RX ORDER — PROCHLORPERAZINE 25 MG
25 SUPPOSITORY, RECTAL RECTAL EVERY 12 HOURS PRN
Status: DISCONTINUED | OUTPATIENT
Start: 2024-07-10 | End: 2024-07-10 | Stop reason: HOSPADM

## 2024-07-10 RX ORDER — HYDROCORTISONE 25 MG/G
CREAM TOPICAL 3 TIMES DAILY PRN
Status: DISCONTINUED | OUTPATIENT
Start: 2024-07-10 | End: 2024-07-11 | Stop reason: HOSPADM

## 2024-07-10 RX ORDER — METOCLOPRAMIDE 10 MG/1
10 TABLET ORAL EVERY 6 HOURS PRN
Status: DISCONTINUED | OUTPATIENT
Start: 2024-07-10 | End: 2024-07-10 | Stop reason: HOSPADM

## 2024-07-10 RX ORDER — METOCLOPRAMIDE HYDROCHLORIDE 5 MG/ML
10 INJECTION INTRAMUSCULAR; INTRAVENOUS EVERY 6 HOURS PRN
Status: DISCONTINUED | OUTPATIENT
Start: 2024-07-10 | End: 2024-07-10 | Stop reason: HOSPADM

## 2024-07-10 RX ORDER — NALOXONE HYDROCHLORIDE 0.4 MG/ML
0.4 INJECTION, SOLUTION INTRAMUSCULAR; INTRAVENOUS; SUBCUTANEOUS
Status: DISCONTINUED | OUTPATIENT
Start: 2024-07-10 | End: 2024-07-10 | Stop reason: HOSPADM

## 2024-07-10 RX ORDER — ONDANSETRON 2 MG/ML
4 INJECTION INTRAMUSCULAR; INTRAVENOUS EVERY 6 HOURS PRN
Status: DISCONTINUED | OUTPATIENT
Start: 2024-07-10 | End: 2024-07-10 | Stop reason: HOSPADM

## 2024-07-10 RX ORDER — MODIFIED LANOLIN
OINTMENT (GRAM) TOPICAL
Status: DISCONTINUED | OUTPATIENT
Start: 2024-07-10 | End: 2024-07-11 | Stop reason: HOSPADM

## 2024-07-10 RX ORDER — MISOPROSTOL 200 UG/1
400 TABLET ORAL
Status: DISCONTINUED | OUTPATIENT
Start: 2024-07-10 | End: 2024-07-10 | Stop reason: HOSPADM

## 2024-07-10 RX ORDER — IBUPROFEN 800 MG/1
800 TABLET, FILM COATED ORAL EVERY 6 HOURS PRN
Status: DISCONTINUED | OUTPATIENT
Start: 2024-07-10 | End: 2024-07-11 | Stop reason: HOSPADM

## 2024-07-10 RX ORDER — CITRIC ACID/SODIUM CITRATE 334-500MG
30 SOLUTION, ORAL ORAL
Status: DISCONTINUED | OUTPATIENT
Start: 2024-07-10 | End: 2024-07-10 | Stop reason: HOSPADM

## 2024-07-10 RX ORDER — LIDOCAINE 40 MG/G
CREAM TOPICAL
Status: DISCONTINUED | OUTPATIENT
Start: 2024-07-10 | End: 2024-07-10 | Stop reason: HOSPADM

## 2024-07-10 RX ORDER — PROCHLORPERAZINE MALEATE 10 MG
10 TABLET ORAL EVERY 6 HOURS PRN
Status: DISCONTINUED | OUTPATIENT
Start: 2024-07-10 | End: 2024-07-10 | Stop reason: HOSPADM

## 2024-07-10 RX ORDER — CARBOPROST TROMETHAMINE 250 UG/ML
250 INJECTION, SOLUTION INTRAMUSCULAR
Status: DISCONTINUED | OUTPATIENT
Start: 2024-07-10 | End: 2024-07-11 | Stop reason: HOSPADM

## 2024-07-10 RX ORDER — SODIUM CHLORIDE, SODIUM LACTATE, POTASSIUM CHLORIDE, CALCIUM CHLORIDE 600; 310; 30; 20 MG/100ML; MG/100ML; MG/100ML; MG/100ML
INJECTION, SOLUTION INTRAVENOUS CONTINUOUS
Status: DISCONTINUED | OUTPATIENT
Start: 2024-07-10 | End: 2024-07-10 | Stop reason: HOSPADM

## 2024-07-10 RX ORDER — OXYTOCIN/0.9 % SODIUM CHLORIDE 30/500 ML
340 PLASTIC BAG, INJECTION (ML) INTRAVENOUS CONTINUOUS PRN
Status: DISCONTINUED | OUTPATIENT
Start: 2024-07-10 | End: 2024-07-11 | Stop reason: HOSPADM

## 2024-07-10 RX ORDER — OXYTOCIN/0.9 % SODIUM CHLORIDE 30/500 ML
340 PLASTIC BAG, INJECTION (ML) INTRAVENOUS CONTINUOUS PRN
Status: DISCONTINUED | OUTPATIENT
Start: 2024-07-10 | End: 2024-07-10 | Stop reason: HOSPADM

## 2024-07-10 RX ORDER — OXYTOCIN/0.9 % SODIUM CHLORIDE 30/500 ML
100-340 PLASTIC BAG, INJECTION (ML) INTRAVENOUS CONTINUOUS PRN
Status: DISCONTINUED | OUTPATIENT
Start: 2024-07-10 | End: 2024-07-11 | Stop reason: HOSPADM

## 2024-07-10 RX ORDER — LOPERAMIDE HCL 2 MG
2 CAPSULE ORAL
Status: DISCONTINUED | OUTPATIENT
Start: 2024-07-10 | End: 2024-07-10 | Stop reason: HOSPADM

## 2024-07-10 RX ORDER — CARBOPROST TROMETHAMINE 250 UG/ML
250 INJECTION, SOLUTION INTRAMUSCULAR
Status: DISCONTINUED | OUTPATIENT
Start: 2024-07-10 | End: 2024-07-10 | Stop reason: HOSPADM

## 2024-07-10 RX ORDER — TRANEXAMIC ACID 10 MG/ML
1 INJECTION, SOLUTION INTRAVENOUS EVERY 30 MIN PRN
Status: DISCONTINUED | OUTPATIENT
Start: 2024-07-10 | End: 2024-07-10 | Stop reason: HOSPADM

## 2024-07-10 RX ADMIN — SODIUM CHLORIDE, POTASSIUM CHLORIDE, SODIUM LACTATE AND CALCIUM CHLORIDE 1000 ML: 600; 310; 30; 20 INJECTION, SOLUTION INTRAVENOUS at 05:09

## 2024-07-10 RX ADMIN — DOCUSATE SODIUM 100 MG: 100 CAPSULE, LIQUID FILLED ORAL at 15:36

## 2024-07-10 RX ADMIN — ONDANSETRON 4 MG: 2 INJECTION INTRAMUSCULAR; INTRAVENOUS at 04:06

## 2024-07-10 RX ADMIN — Medication 340 ML/HR: at 09:26

## 2024-07-10 RX ADMIN — LIDOCAINE HYDROCHLORIDE 20 ML: 10 INJECTION, SOLUTION EPIDURAL; INFILTRATION; INTRACAUDAL; PERINEURAL at 09:34

## 2024-07-10 RX ADMIN — SODIUM CHLORIDE, POTASSIUM CHLORIDE, SODIUM LACTATE AND CALCIUM CHLORIDE: 600; 310; 30; 20 INJECTION, SOLUTION INTRAVENOUS at 06:11

## 2024-07-10 RX ADMIN — Medication 8 ML/HR: at 06:03

## 2024-07-10 RX ADMIN — IBUPROFEN 800 MG: 800 TABLET, FILM COATED ORAL at 10:37

## 2024-07-10 ASSESSMENT — ACTIVITIES OF DAILY LIVING (ADL)
ADLS_ACUITY_SCORE: 18
ADLS_ACUITY_SCORE: 22
ADLS_ACUITY_SCORE: 22
ADLS_ACUITY_SCORE: 18
ADLS_ACUITY_SCORE: 22
ADLS_ACUITY_SCORE: 22
ADLS_ACUITY_SCORE: 18
ADLS_ACUITY_SCORE: 22
ADLS_ACUITY_SCORE: 35
ADLS_ACUITY_SCORE: 18
ADLS_ACUITY_SCORE: 22
ADLS_ACUITY_SCORE: 18
ADLS_ACUITY_SCORE: 22
ADLS_ACUITY_SCORE: 18
ADLS_ACUITY_SCORE: 18
ADLS_ACUITY_SCORE: 22

## 2024-07-10 NOTE — PROGRESS NOTES
Report Given to Physician:    Dr. Lino notified that Neisha Montano presented to OB for increased contraction intensity and clear vaginal discharge. ROM + positive for SROM. SVE 3cm. Contractions about every 3-5 minutes apart. Pt rates pain at 6/10 with contractions.       Received orders per Dr. Lino to admit pt acute to Labor and Delivery.

## 2024-07-10 NOTE — L&D DELIVERY NOTE
VAGINAL DELIVERY SUMMARY    Neisha Montano   MRN: [id number]    DOS: 7/10/2024     Neisha is a  24 year old  at 40w3d.  Labor was spontaneous. Patient received epidural for pain relief.   Progressed to complete.  Patient pushed to deliver a viable male infant in OA position on 7/10/2024. Shoulders delivered without difficulty. Cord clamped and cut after one minute, infant placed on mother's abdomen and attended to by waiting nursing staff.  Apgars 8 and 9.  Placenta spontaneously delivered intact with 3VC. Inspection revealed a 2nd degree internal vaginal laceration, repaired with 3-0 Vicryl Rapide in the usual fashion and a 1st degree left labial laceration repaired with an inverted running subcuticular suture of 4-0 Vicryl .  No complications. Mother and baby remained in the LDRP room in stable condition.  QBL: 200  Blood type: A Pos    Andrea Lino MD  OB/GYN Hospitalist  Cell: [phone]

## 2024-07-10 NOTE — PROGRESS NOTES
Face to face report given with opportunity to observe patient.    Report given to Mimi Hui RN   7/10/2024  7:10 AM

## 2024-07-10 NOTE — ANESTHESIA PROCEDURE NOTES
"Epidural catheter Procedure Note    Pre-Procedure   Staff -        CRNA: Vince Wallace APRN CRNA       Performed By: CRNA       Procedure Start/Stop Times: 7/10/2024 5:40 AM and 7/10/2024 6:00 AM       Pre-Anesthestic Checklist: patient identified, IV checked, risks and benefits discussed, informed consent, monitors and equipment checked, pre-op evaluation and at physician/surgeon's request  Timeout:       Correct Patient: Yes        Correct Procedure: Yes        Correct Site: Yes        Correct Position: Yes   Procedure Documentation  Procedure: epidural catheter       Diagnosis: labor pain       Patient Position: sitting       Patient Prep/Sterile Barriers: sterile gloves, mask, patient draped       Skin prep: Betadine       Local skin infiltrated with 3 mL of 1% lidocaine.        Insertion Site: L2-3. (midline approach).       Technique: LORT saline        NELSON at 7 cm.       Needle Type: [employer] needle       Needle Gauge: 18.        Needle Length (Inches): 3.5           Catheter threaded easily.         5 cm epidural space.         Threaded 12 cm at skin.         # of attempts: 1 and  # of redirects:     Assessment/Narrative         Paresthesias: No.       Test dose of 3 mL lidocaine 1.5% w/ 1:200,000 epinephrine at 05:44 CDT.         Test dose negative, 3 minutes after injection, for signs of intravascular, subdural, or intrathecal injection.       Insertion/Infusion Method: LORT saline       Aspiration negative for Heme or CSF via Epidural Catheter.    Medication(s) Administered   Medication Administration Time: 7/10/2024 5:40 AM      FOR [facility] ONLY:   Pain Team Contact information: please page the Pain Team Via [employer]. Search \"Pain\". During daytime hours, please page the attending first. At night please page the resident first.      "

## 2024-07-10 NOTE — PROGRESS NOTES
Dr. Lino updated on pt status. No new orders received at this time. Dr. Lino reports he will be in shortly to evaluate pt.

## 2024-07-10 NOTE — PROGRESS NOTES
Data: [facility] presented [facility]: 7/10/2024  2:48 AM.  Reason for maternal/fetal assessment is uterine contractions and leaking vaginal fluid. Patient reports clear vaginal discharge since about 1430 yesterday, increase in contraction intensity since around 0000, nausea, and small amount of bloody show. Patient denies vaginal bleeding, abdominal pain, pelvic pressure, vomiting, headache, visual disturbances, epigastric or RUQ pain, significant edema. Patient reports fetal movement is normal. Patient is a 40w3d .  Prenatal record reviewed. Pregnancy has been uncomplicated.    Vital signs wnl. Support person is present.     Action: Verbal consent for EFM, SVE, ROM+ test, and urine drug screen if needed. Triage assessment completed.     Response: Patient verbalized agreement with plan. Will contact Dr. Lino with update and further orders.

## 2024-07-10 NOTE — PROGRESS NOTES
CRNA at bedside around 0540.     Time out performed at 0548.     CRNA started epidural at 0551, placed epidural catheter at 0553, administered test dose at 0554, and administered bolus at 0557.

## 2024-07-10 NOTE — H&P
[facility] Labor and Delivery History and Physical    Neisha Montano MRN# [id number]   Age: 24 year old YOB: 2000     Date of Admission:  7/10/2024    Primary care provider: No Ref-Primary, Physician           Chief Complaint:   Neisha Montano is a 24 year old female who is 40w3d pregnant and being admitted for active labor management. Prenatal record/H and P reviewed with patient and unchanged.          Pregnancy history:     OBSTETRIC HISTORY:    OB History    Para Term  AB Living   2 0 0 0 1 0   SAB IAB Ectopic Multiple Live Births   0 0 0 0 0      # Outcome Date GA Lbr Arnoldo/2nd Weight Sex Type Anes PTL Lv   2 Current            1 AB 2018     AB, COMPLETE          EDC: Estimated Date of Delivery: Data Unavailable    Prenatal Labs:   Lab Results   Component Value Date    AS Negative 07/10/2024    CHPCRT Not Detected 2023    GCPCRT Not Detected 2023    HGB 10.9 (L) 07/10/2024       GBS Status:   No results found for: GBS NEG    Active Problem List  Patient Active Problem List   Diagnosis    Normal labor     Medication Prior to Admission  Medications Prior to Admission   Medication Sig Dispense Refill Last Dose    calcium carbonate 750 MG CHEW Take 750 mg by mouth daily   7/10/2024    Prenatal Vit-Fe Sulfate-FA-DHA (PRENATAL VITAMIN/MIN +DHA) 27-0.8-200 MG CAPS Take 1 tablet by mouth daily   More than a month   .        Maternal Past Medical History:   No past medical history on file.                    Family History:   Unchanged from prenatal record            Social History:   Unchanged from prenatal record         Review of Systems:   Per HPI.  Other systems reviewed and negative         Physical Exam:     Vitals:    07/10/24 0654 07/10/24 0655 07/10/24 0659 07/10/24 0700   BP:  126/63  130/62   BP Location:       Patient Position:       Cuff Size:       Resp:       Temp:       TempSrc:       SpO2: 100%  100%      Chest: CTA  CV:  RRR without  murmers  General:  Alert and oriented  Abdomen soft, non-tender, gravid  Ext: neg  Cervix:   Membranes: SROM   Dilation: 9   Effacement: 100%   Station:+1   Consistency: soft   Position: Mid  Presentation:Cephalic  Fetal Heart Rate Tracing: reactive and reassuring  Tocometer: external monitor                       Assessment:   Neisha Montano is a 40w3d pregnant female admitted with active.        Plan:   Admit, Routine intrapartum care and monitoring per protocol.    Andrea Lino MD

## 2024-07-10 NOTE — PLAN OF CARE
Problem: Labor  Goal: Hemostasis  Outcome: Progressing  Goal: Stable Fetal Wellbeing  Outcome: Progressing  Goal: Effective Progression to Delivery  Outcome: Progressing  Goal: Absence of Infection Signs and Symptoms  Outcome: Progressing  Goal: Acceptable Pain Control  Outcome: Progressing  Goal: Normal Uterine Contraction Pattern  Outcome: Progressing

## 2024-07-10 NOTE — PLAN OF CARE
Vaginal Delivery Note   of viable Male.  Nursery RN YESSICA Puente present.  Infant with spontaneous cry, to mother's abdomen, dried and stimulated. Keke cares provided.  Mother and baby in stable condition. Baby skin-to-skin with mom. ID bands placed on infant, mom and dad.

## 2024-07-10 NOTE — ANESTHESIA PREPROCEDURE EVALUATION
"Anesthesia Pre-Procedure Evaluation    Patient: Neisha Montano   MRN: [id number] : 2000        Procedure :           No past medical history on file.   No past surgical history on file.   No Known Allergies   Social History     Tobacco Use     Smoking status: Every Day     Types: Vaping Device     Smokeless tobacco: Never   Substance Use Topics     Alcohol use: No      Wt Readings from Last 1 Encounters:   24 73.3 kg (161 lb 8 oz)        Anesthesia Evaluation   Pt has not had prior anesthetic         ROS/MED HX  ENT/Pulmonary:       Neurologic:       Cardiovascular:       METS/Exercise Tolerance:     Hematologic:       Musculoskeletal:       GI/Hepatic:       Renal/Genitourinary:       Endo:       Psychiatric/Substance Use:       Infectious Disease:       Malignancy:       Other:          Physical Exam    Airway        Mallampati: II   TM distance: > 3 FB   Neck ROM: full   Mouth opening: > 3 cm    Respiratory Devices and Support         Dental  no notable dental history         Cardiovascular   cardiovascular exam normal          Pulmonary   pulmonary exam normal            Other findings: Platelets >100  OUTSIDE LABS:  CBC:   Lab Results   Component Value Date    WBC 10.2 07/10/2024    WBC 7.3 2024    HGB 10.9 (L) 07/10/2024    HGB 11.1 (L) 2024    HCT 34.1 (L) 07/10/2024    HCT 32.8 (L) 2024     07/10/2024     2024     BMP: No results found for: \"NA\", \"POTASSIUM\", \"CHLORIDE\", \"CO2\", \"BUN\", \"CR\", \"GLC\"  COAGS: No results found for: \"PTT\", \"INR\", \"FIBR\"  POC:   Lab Results   Component Value Date    HCG Negative 2017     HEPATIC: No results found for: \"ALBUMIN\", \"PROTTOTAL\", \"ALT\", \"AST\", \"GGT\", \"ALKPHOS\", \"BILITOTAL\", \"BILIDIRECT\", \"RAUL\"  OTHER: No results found for: \"PH\", \"LACT\", \"A1C\", \"YESICA\", \"PHOS\", \"MAG\", \"LIPASE\", \"AMYLASE\", \"TSH\", \"T4\", \"T3\", \"CRP\", \"SED\"    Anesthesia Plan    ASA Status:  1       Anesthesia Type: Epidural.          "     Consents    Anesthesia Plan(s) and associated risks, benefits, and realistic alternatives discussed. Questions answered and patient/representative(s) expressed understanding.     - Discussed:     - Discussed with:  Patient            Postoperative Care            Comments:    Other Comments: Discussed risks and benefits with patient including itching, sore back, infection, hematoma, spinal headache, CV complications, inability to place, nerve damage. Pt wishes to proceed.          Vince Wallace, APRN CRNA    I have reviewed the pertinent notes and labs in the chart from the past 30 days and (re)examined the patient.  Any updates or changes from those notes are reflected in this note.

## 2024-07-10 NOTE — PLAN OF CARE
Assessments completed as charted. B/P: 119/63, T: 98.2, P: 70, R: 16. Rates pain: 0/10 . Voiding without difficulty. Fundus: Midline Firm 1 below U. Lochia: scant. Activity: unrestricted with out pain  and normal activity. Infant feeding: Breast feeding going well.           Postpartum breastfeeding assessment completed and education provided, see Patient Education Activity.  Items included in the education are:   proper positioning and latch  effectiveness of feeding  manual expression  handling and storing breastmilk  maintenance of breastfeeding for the first 6 months  sign/symptoms of infant feeding issues requiring referral to qualified health care provider  Postpartum care education provided, see Patient Education activity. Patient denies needs. Will monitor.  Mimi Yepez RN

## 2024-07-10 NOTE — PROGRESS NOTES
Discussed admission, plan of care, and pain management with pt. Pt agreeable to plan of care and admission. Pt reports will attempt to labor without epidural or sublimaze at this time but would be open to trying nitrous oxide.

## 2024-07-11 VITALS
TEMPERATURE: 98.1 F | SYSTOLIC BLOOD PRESSURE: 119 MMHG | OXYGEN SATURATION: 99 % | HEART RATE: 70 BPM | DIASTOLIC BLOOD PRESSURE: 65 MMHG | RESPIRATION RATE: 16 BRPM | BODY MASS INDEX: 26.82 KG/M2 | WEIGHT: 151.4 LBS

## 2024-07-11 LAB
HGB BLD-MCNC: 9.5 G/DL (ref 11.7–15.7)
HOLD SPECIMEN: NORMAL

## 2024-07-11 PROCEDURE — 250N000013 HC RX MED GY IP 250 OP 250 PS 637: Performed by: OBSTETRICS & GYNECOLOGY

## 2024-07-11 PROCEDURE — 85018 HEMOGLOBIN: CPT | Performed by: OBSTETRICS & GYNECOLOGY

## 2024-07-11 PROCEDURE — 36415 COLL VENOUS BLD VENIPUNCTURE: CPT | Performed by: OBSTETRICS & GYNECOLOGY

## 2024-07-11 RX ORDER — CEFAZOLIN SODIUM/WATER 2 G/20 ML
2 SYRINGE (ML) INTRAVENOUS
Status: CANCELLED | OUTPATIENT
Start: 2024-07-11

## 2024-07-11 RX ORDER — METHYLERGONOVINE MALEATE 0.2 MG/ML
200 INJECTION INTRAVENOUS
Status: CANCELLED | OUTPATIENT
Start: 2024-07-11

## 2024-07-11 RX ORDER — LOPERAMIDE HCL 2 MG
2 CAPSULE ORAL
Status: CANCELLED | OUTPATIENT
Start: 2024-07-11

## 2024-07-11 RX ORDER — SODIUM CHLORIDE, SODIUM LACTATE, POTASSIUM CHLORIDE, CALCIUM CHLORIDE 600; 310; 30; 20 MG/100ML; MG/100ML; MG/100ML; MG/100ML
INJECTION, SOLUTION INTRAVENOUS CONTINUOUS
Status: CANCELLED | OUTPATIENT
Start: 2024-07-11

## 2024-07-11 RX ORDER — CEFAZOLIN SODIUM/WATER 2 G/20 ML
2 SYRINGE (ML) INTRAVENOUS SEE ADMIN INSTRUCTIONS
Status: CANCELLED | OUTPATIENT
Start: 2024-07-11

## 2024-07-11 RX ORDER — CITRIC ACID/SODIUM CITRATE 334-500MG
30 SOLUTION, ORAL ORAL
Status: CANCELLED | OUTPATIENT
Start: 2024-07-11

## 2024-07-11 RX ORDER — MISOPROSTOL 200 UG/1
400 TABLET ORAL
Status: CANCELLED | OUTPATIENT
Start: 2024-07-11

## 2024-07-11 RX ORDER — OXYTOCIN 10 [USP'U]/ML
10 INJECTION, SOLUTION INTRAMUSCULAR; INTRAVENOUS
Status: CANCELLED | OUTPATIENT
Start: 2024-07-11

## 2024-07-11 RX ORDER — CARBOPROST TROMETHAMINE 250 UG/ML
250 INJECTION, SOLUTION INTRAMUSCULAR
Status: CANCELLED | OUTPATIENT
Start: 2024-07-11

## 2024-07-11 RX ORDER — LIDOCAINE 40 MG/G
CREAM TOPICAL
Status: CANCELLED | OUTPATIENT
Start: 2024-07-11

## 2024-07-11 RX ORDER — OXYTOCIN/0.9 % SODIUM CHLORIDE 30/500 ML
100-340 PLASTIC BAG, INJECTION (ML) INTRAVENOUS CONTINUOUS PRN
Status: CANCELLED | OUTPATIENT
Start: 2024-07-11

## 2024-07-11 RX ORDER — OXYTOCIN/0.9 % SODIUM CHLORIDE 30/500 ML
340 PLASTIC BAG, INJECTION (ML) INTRAVENOUS CONTINUOUS PRN
Status: CANCELLED | OUTPATIENT
Start: 2024-07-11

## 2024-07-11 RX ORDER — IBUPROFEN 200 MG
800 TABLET ORAL EVERY 6 HOURS PRN
Qty: 160 TABLET | Refills: 0 | Status: SHIPPED
Start: 2024-07-11 | End: 2024-07-21

## 2024-07-11 RX ORDER — TRANEXAMIC ACID 10 MG/ML
1 INJECTION, SOLUTION INTRAVENOUS ONCE
Status: CANCELLED | OUTPATIENT
Start: 2024-07-11

## 2024-07-11 RX ORDER — LOPERAMIDE HCL 2 MG
4 CAPSULE ORAL
Status: CANCELLED | OUTPATIENT
Start: 2024-07-11

## 2024-07-11 RX ORDER — ACETAMINOPHEN 325 MG/1
975 TABLET ORAL ONCE
Status: CANCELLED | OUTPATIENT
Start: 2024-07-11 | End: 2024-07-11

## 2024-07-11 RX ORDER — ACETAMINOPHEN 325 MG/1
650 TABLET ORAL EVERY 4 HOURS PRN
Status: SHIPPED
Start: 2024-07-11 | End: 2024-07-21

## 2024-07-11 RX ADMIN — IBUPROFEN 800 MG: 800 TABLET, FILM COATED ORAL at 05:25

## 2024-07-11 RX ADMIN — IBUPROFEN 800 MG: 800 TABLET, FILM COATED ORAL at 13:40

## 2024-07-11 ASSESSMENT — ACTIVITIES OF DAILY LIVING (ADL)
ADLS_ACUITY_SCORE: 18
ADLS_ACUITY_SCORE: 22
ADLS_ACUITY_SCORE: 18
ADLS_ACUITY_SCORE: 18
ADLS_ACUITY_SCORE: 22
ADLS_ACUITY_SCORE: 18
ADLS_ACUITY_SCORE: 22
ADLS_ACUITY_SCORE: 18
ADLS_ACUITY_SCORE: 22
ADLS_ACUITY_SCORE: 22
ADLS_ACUITY_SCORE: 18
ADLS_ACUITY_SCORE: 22
ADLS_ACUITY_SCORE: 22

## 2024-07-11 NOTE — PLAN OF CARE
Problem: Postpartum (Vaginal Delivery)  Goal: Successful Parent Role Transition  Outcome: Progressing  Goal: Hemostasis  Outcome: Progressing  Goal: Absence of Infection Signs and Symptoms  Outcome: Progressing  Goal: Anesthesia/Sedation Recovery  Outcome: Progressing  Goal: Optimal Pain Control and Function  Outcome: Progressing  Intervention: Prevent or Manage Pain  Recent Flowsheet Documentation  Taken 7/10/2024 1918 by Stephie Hui RN  Pain Management Interventions:   declines   medication offered but refused  Goal: Effective Urinary Elimination  Outcome: Progressing

## 2024-07-11 NOTE — PROGRESS NOTES
Face to face report given with opportunity to observe patient.    Report given to Kathrin Hui RN   7/11/2024  7:15 AM

## 2024-07-11 NOTE — PLAN OF CARE
Vitals stable overnight and assessment WNL. Pt reports minimal rubra with no clots. Asymptomatic for preeclampsia. Pt reports minimal to no pain with occasional need for PRN analgesics that are effective in providing pain relief for pt. Pt up ab yo in room and voiding without difficulty. Pt attentive to  needs.

## 2024-07-11 NOTE — PLAN OF CARE
Face to face report given with opportunity to observe patient.    Report given to YESSICA Leyva RN   7/10/2024  7:22 PM

## 2024-07-11 NOTE — PROGRESS NOTES
Pt discharged to home with SO and  at 1435. AVS and discharge paperwork gone and reviewed with pt. Pt stated understanding and stated she has no other questions or concerns at this time. Pt verbalized readiness to discharge home and discharge order was placed. Pt and family packed all belongings took them with them upon discharge. Prior to Discharge 4 part bands were verified with RN and pt.

## 2024-07-11 NOTE — DISCHARGE INSTRUCTIONS
Warning Signs after Having a Baby    Keep this paper on your fridge or somewhere else where you can see it.    Call your provider if you have any of these symptoms up to 12 weeks after having your baby.    Thoughts of hurting yourself or your baby  Pain in your chest or trouble breathing  Severe headache not helped by pain medicine  Eyesight concerns (blurry vision, seeing spots or flashes of light, other changes to eyesight)  Fainting, shaking or other signs of a seizure    Call 9-1-1 if you feel that it is an emergency.     The symptoms below can happen to anyone after giving birth. They can be very serious. Call your provider if you have any of these warning signs.    My provider s phone number: _______________________    Losing too much blood (hemorrhage)    Call your provider if you soak through a pad in less than an hour or pass blood clots bigger than a golf ball. These may be signs that you are bleeding too much.    Blood clots in the legs or lungs    After you give birth, your body naturally clots its blood to help prevent blood loss. Sometimes this increased clotting can happen in other areas of the body, like the legs or lungs. This can block your blood flow and be very dangerous.     Call your provider if you:  Have a red, swollen spot on the back of your leg that is warm or painful when you touch it.   Are coughing up blood.     Infection    Call your provider if you have any of these symptoms:  Fever of 100.4 F (38 C) or higher.  Pain or redness around your stitches if you had an incision.   Any yellow, white, or green fluid coming from places where you had stitches or surgery.    Mood Problems (postpartum depression)    Many people feel sad or have mood changes after having a baby. But for some people, these mood swings are worse.     Call your provider right away if you feel so anxious or nervous that you can't care for yourself or your baby.    Preeclampsia (high blood pressure)    Even if you  didn't have high blood pressure when you were pregnant, you are at risk for the high blood pressure disease called preeclampsia. This risk can last up to 12 weeks after giving birth.     Call your provider if you have:   Pain on your right side under your rib cage  Sudden swelling in the hands and face    Remember: You know your body. If something doesn't feel right, get medical help.     For informational purposes only. Not to replace the advice of your health care provider. Copyright 2020 [facility]. All rights reserved. Clinically reviewed by Odette Hampton, RNC-OB, MSN. [employer] 214772 - Rev 02/23.

## 2024-07-11 NOTE — DISCHARGE SUMMARY
Discharge Summary    Neisha Montano MRN# [id number]   YOB: 2000 Age: 24 year old     Date of Admission:  7/10/2024  Date of Discharge:  2024  Admitting Physician:  Andrea Lino MD  Discharge Physician:  Andrea Lino MD  Discharging Service:  Obstetrics and Gynecology     Primary Provider: No Ref-Primary, Physician          Admission Diagnoses:   Encounter for triage in pregnant patient  Normal labor          Discharge Diagnosis:     Term intrauterine pregnancy, delivered  Blood loss anemia, asymptomatic    Hemoglobin 10.9 (L) On Admission 9.5 (L) At discharge                Discharge Disposition:     Discharged to home           Condition on Discharge:     Discharge condition: Stable   Discharge vitals: Blood pressure 119/65, pulse 70, temperature 98.1  F (36.7  C), temperature source Oral, resp. rate 16, last menstrual period 10/01/2023, SpO2 99%, unknown if currently breastfeeding.   Code status on discharge: Full Code           Procedures / Labs / Imaging:     Repair vaginal/labial lacerations          Medications Prior to Admission:     Medications Prior to Admission   Medication Sig Dispense Refill Last Dose    calcium carbonate 750 MG CHEW Take 750 mg by mouth daily   7/10/2024    Prenatal Vit-Fe Sulfate-FA-DHA (PRENATAL VITAMIN/MIN +DHA) 27-0.8-200 MG CAPS Take 1 tablet by mouth daily   More than a month             Discharge Medications:     Current Discharge Medication List        START taking these medications    Details   acetaminophen (TYLENOL) 325 MG tablet Take 2 tablets (650 mg) by mouth every 4 hours as needed for mild pain (greater than or equal to 38 C /100.4 F (oral) or 38.5 C/ 101.4 F (core).)    Associated Diagnoses: Single liveborn infant delivered vaginally      ibuprofen (ADVIL/MOTRIN) 200 MG tablet Take 4 tablets (800 mg) by mouth every 6 hours as needed for other, moderate pain or mild pain (cramping)  Qty: 160 tablet, Refills: 0    Associated Diagnoses: Single  liveborn infant delivered vaginally           CONTINUE these medications which have NOT CHANGED    Details   calcium carbonate 750 MG CHEW Take 750 mg by mouth daily      Prenatal Vit-Fe Sulfate-FA-DHA (PRENATAL VITAMIN/MIN +DHA) 27-0.8-200 MG CAPS Take 1 tablet by mouth daily                Consultations:     No consultations were requested during this admission             Brief History of Illness:   Neisha Montano is a 24 year old female who was admitted for spontaneous labor          Hospital Course:     .   Uncomplicated postpartum course.  Discharged on ppd# 1               Significant Results:      07/10/24 03:45 24 06:38   Hemoglobin 10.9 (L) 9.5 (L)   (L): Data is abnormally low             Pending Results:     None           Discharge Instructions and Follow-Up:   Continue PNVs + OTC Iron  Discharge diet: Regular   Discharge activity: Activity as tolerated.  Pelvic rest for 6 weeks   Discharge follow-up: Follow up for postpartum exam in 6 weeks     Andrea Lino MD  OB/GYN Hospitalist  Cell: [phone]

## 2024-07-11 NOTE — PROGRESS NOTES
Patient:   Neisha  Significant other, Brandi present.  Babe in bassinet.  Both attentive to babe    Lives at: home  Lives with: Brandi and dog   Support System: family, friends    Primary PCP:  No Ref-Primary, Physician- not interested in an establish care appointment   OB:  Fierro  Baby PCP: Mitch   Insurance: BCBS  Pregnancy Hx:   first child, routine visits, THC+       Agency Contacts: not connected   Mental Health: denies   Violence/Stressors: denies   Substance Abuse: vapes, marijuana use- writer encouraged cessation of both d/t risks of babe.  Also, advised of report submitted to [place] d/t positive drug screen.  Verbalized understanding.      Adequate resources for needs (housing, utilities, food/med): yes     Transportation:  YES  Car Seat: Yes  Breast Pump:  Yes  Formula: No  Diapers:  Yes  Crib or Bassinet: both set up     Education concerns on self/baby care:   no concerns noted while writer present in room.      Community Resources: not currently connected.  Aware that a public health nurse referral will be made.  Discussed [facility] and [employer] as options for supplies if necessary.

## 2024-07-11 NOTE — PLAN OF CARE
Problem: Postpartum (Vaginal Delivery)  Goal: Successful Parent Role Transition  Outcome: Met  Goal: Hemostasis  Outcome: Met  Goal: Absence of Infection Signs and Symptoms  Outcome: Met  Goal: Anesthesia/Sedation Recovery  Outcome: Met  Goal: Optimal Pain Control and Function  Outcome: Met  Goal: Effective Urinary Elimination  Outcome: Met   Goal Outcome Evaluation:    Pt is stable and going home at this time. No signs of infection, pain managed with rest and medication, voiding and stooling

## 2024-07-12 LAB
CANNABINOIDS UR CFM-MCNC: 1612 NG/ML
CANNABINOIDS UR CFM-MCNC: 280 NG/ML
CARBOXYTHC/CREAT UR: 1127 NG/MG CREAT
CARBOXYTHC/CREAT UR: 1217 NG/MG CREAT

## 2024-07-17 ENCOUNTER — LACTATION ENCOUNTER (OUTPATIENT)
Dept: OBGYN | Facility: HOSPITAL | Age: 24
End: 2024-07-17

## 2024-07-17 ENCOUNTER — HOSPITAL ENCOUNTER (OUTPATIENT)
Dept: OBGYN | Facility: HOSPITAL | Age: 24
Discharge: HOME | End: 2024-07-17
Attending: OBSTETRICS & GYNECOLOGY | Admitting: OBSTETRICS & GYNECOLOGY
Payer: COMMERCIAL

## 2024-07-17 PROCEDURE — G0463 HOSPITAL OUTPT CLINIC VISIT: HCPCS

## 2024-07-17 NOTE — LACTATION NOTE
This note was copied from a baby's chart.  Outpatient Lactation Visit    Francois Cabrera                                                                                                   [id number]      Consultation Date: 2024     Reason for Lactation Referral: routine follow-up    Baby's :07/10/24    Baby's Current Age: 7d  Baby's Gestation Age: 40w 3d    Primary Care Provider: Dr. Meyer    History of Present Illness: none    MATERNAL HISTORY   History of Breast Surgery: No.  Breast Changes During Pregnancy:Yes  Breast Feeding History: No  Maternal Meds:none  Pregnancy complications:none  Delivery:     MATERNAL ASSESSMENT    Breast Size: average, symmetrical, soft after feeding and filling prior to feeding  Nipple Appearance - Left: intact with no breakdown noted  Nipple Appearance - Right: intact with no breakdown noted  Nipple Erectility - Left: erect with stimulation  Nipple Erectility - Right: erect with stimulation  Areolas Compressibility: soft  Nipple Size: average  Milk Supply: mature    INFANT ASSESSMENT    Oral Anatomy  Mouth: normal  Palate: normal  Jaw: normal  Tongue: normal  Frenulum: normal   Digital Suck Exam: not assessed    FEEDING   No feeding session observed during appt today. Mom states babe ate prior to appt. Weight up from discharge at appt with Dr. Meyer.Mom reports babe eating every 2-3 hours on a side for 10 min. States babe appears satisfied, burps well and retains entire feedings. Mom says milk cam in 3 days ago and reports relief noted from engorgement. States breast do feel less full after feedings. No pain noted with nursing sessions and nipples intact. Mom states she is pumping after feedings as need and noted 6-8 oz breast milk from both breasts.     Education reviewed and states understanding. No concerns noted at this time. Contact information given and encouraged to call should any questions or concerns arise.     EDUCATION   Exclusivity explained and  encouraged in the early weeks to establish breastfeeding and order in milk supply.  Deep latch explained for proper positioning of breast in infant's mouth, maximizing milk transfer and comfort.  Hand expression taught and return demonstration observed with mature milk present.  Rooming-in encouraged with explanation of the benefits.  Continue to apply expressed breast milk and Lanolin cream to nipples after feedings for healing and comfort.  Postpartum breastfeeding assessment completed and education provided.  Items included in the education are:   proper positioning and latch  maternal nutritional needs  s/s of plugged ducts/mastitis  Feeding cues  sterilization and cleaning of pumping materials and bottles  effectiveness of feeding  manual expression  handling and storing breastmilk  maintenance of breastfeeding for the first 6 months  sign/symptoms of infant feeding issues requiring referral to qualified health care provider    FEEDING PLAN   Continue with current feeding plan. Feed on demand (8-12 feedings/24 hr period) when  elicits feeding cues with deep latch.       FOLLOW-UP   Reassurance and encouragement provided in regard to mom's concerns about milk supply.  Follow-up support information provided.  Parents plan to keep  Well-Child Check with Dr. Meyer next week for further support and monitoring.      Face-to-face Time: 45 minutes with assessment and education.    Cindi Melchor RN, RNC-OB, IBCLC  Lactation Consultant  [facility]

## 2024-08-01 ENCOUNTER — PRENATAL OFFICE VISIT (OUTPATIENT)
Dept: OBGYN | Facility: OTHER | Age: 24
End: 2024-08-01
Attending: NURSE PRACTITIONER
Payer: COMMERCIAL

## 2024-08-01 VITALS
WEIGHT: 143.9 LBS | RESPIRATION RATE: 16 BRPM | TEMPERATURE: 98.5 F | BODY MASS INDEX: 25.5 KG/M2 | SYSTOLIC BLOOD PRESSURE: 108 MMHG | OXYGEN SATURATION: 98 % | DIASTOLIC BLOOD PRESSURE: 64 MMHG | HEIGHT: 63 IN | HEART RATE: 81 BPM

## 2024-08-01 DIAGNOSIS — Z30.011 ENCOUNTER FOR INITIAL PRESCRIPTION OF CONTRACEPTIVE PILLS: ICD-10-CM

## 2024-08-01 PROCEDURE — 99207 PR NO CHARGE LOS: CPT | Performed by: NURSE PRACTITIONER

## 2024-08-01 RX ORDER — ACETAMINOPHEN AND CODEINE PHOSPHATE 120; 12 MG/5ML; MG/5ML
0.35 SOLUTION ORAL DAILY
Qty: 84 TABLET | Refills: 4 | Status: SHIPPED | OUTPATIENT
Start: 2024-08-01

## 2024-08-01 ASSESSMENT — PAIN SCALES - GENERAL: PAINLEVEL: NO PAIN (0)

## 2024-08-01 NOTE — PROGRESS NOTES
"SUBJECTIVE:  Neisha Montano is a 24 year old female P1 here for a 3 week postpartum visit.  She had a  on 7/10/24 delivering a healthy baby boy weighing 7 lbs 4 oz at term.      Today's Depression Rating was 7    delivery complications:  No  breast feeding:  Yes, going well.  Denies concerns.  bladder problems:  No  bowel problems/hemorrhoids:  No  episiotomy/laceration/incision healed? Yes: no discomfort  vaginal flow:  light  [place]:  No  contraception:  oral contraceptive  emotional adjustment:  doing well and happy      OBJECTIVE:  Blood pressure 108/64, pulse 81, temperature 98.5  F (36.9  C), temperature source Tympanic, resp. rate 16, height 1.6 m (5' 3\"), weight 65.3 kg (143 lb 14.4 oz), last menstrual period 10/01/2023, SpO2 98%, unknown if currently breastfeeding.   General - pleasant female in no acute distress.    ASSESSMENT:  normal 3 week postpartum check    PLAN:  Continue with PP restrictions  Micronor to start now - abstain  Breastfeeding anticipatory guidance.  S/sx of PPD reviewed.  Return at 6 weeks as scheduled.     "

## 2024-08-22 ENCOUNTER — PRENATAL OFFICE VISIT (OUTPATIENT)
Dept: OBGYN | Facility: OTHER | Age: 24
End: 2024-08-22
Attending: OBSTETRICS & GYNECOLOGY
Payer: COMMERCIAL

## 2024-08-22 VITALS
BODY MASS INDEX: 25.29 KG/M2 | WEIGHT: 142.7 LBS | TEMPERATURE: 99.1 F | OXYGEN SATURATION: 98 % | SYSTOLIC BLOOD PRESSURE: 114 MMHG | HEIGHT: 63 IN | HEART RATE: 106 BPM | DIASTOLIC BLOOD PRESSURE: 72 MMHG

## 2024-08-22 DIAGNOSIS — Z12.4 SCREENING FOR CERVICAL CANCER: Primary | ICD-10-CM

## 2024-08-22 PROCEDURE — 99207 PR POST PARTUM EXAM: CPT | Performed by: OBSTETRICS & GYNECOLOGY

## 2024-08-22 PROCEDURE — G0123 SCREEN CERV/VAG THIN LAYER: HCPCS | Performed by: OBSTETRICS & GYNECOLOGY

## 2024-08-22 ASSESSMENT — PAIN SCALES - GENERAL: PAINLEVEL: NO PAIN (0)

## 2024-08-22 NOTE — PROGRESS NOTES
"SUBJECTIVE:  Neisha Montano is a 24 year old female P1 here for a postpartum visit.  She had a   delivering a healthy baby boy  Currently no complaints and doing well.    Today's Depression Rating was 2    delivery complications:  No  breast feeding:  Yes  bladder problems:  No  bowel problems/hemorrhoids:  No  episiotomy/laceration/incision healed? Yes  vaginal flow:  None  [place]:  No  contraception:  oral contraceptive  emotional adjustment:  doing well and happy      OBJECTIVE:  Blood pressure 114/72, pulse 106, temperature 99.1  F (37.3  C), temperature source Tympanic, height 1.6 m (5' 3\"), weight 64.7 kg (142 lb 11.2 oz), last menstrual period 10/01/2023, SpO2 98%, currently breastfeeding.   General - pleasant female in no acute distress.  Abdomen - soft, nontender, nondistended, no hepatosplenomegaly.  Pelvic - EG: normal adult female, BUS: within normal limits, Vagina: well rugated, no discharge, Cervix: no lesions or CMT, Uterus: firm, normal sized and nontender, Adnexae: no masses or tenderness.  Rectovaginal - deferred.    ASSESSMENT:  normal postpartum exam.  Released from pregnancy related restrictions    PLAN:    May resume normal activities without restrictions  Pap smear Done today    The patient will use oral contraceptive for birth control. Full counseling was provided, and all questions answered. Compliance is strongly emphasized.  Return to clinic in one year for an annual, when due for a pap smear or PRN.    Tye Fierro MD   "

## 2024-08-29 LAB
BKR LAB AP GYN ADEQUACY: NORMAL
BKR LAB AP GYN INTERPRETATION: NORMAL
BKR LAB AP HPV REFLEX: NORMAL
BKR LAB AP PREVIOUS ABNORMAL: NORMAL
PATH REPORT.COMMENTS IMP SPEC: NORMAL
PATH REPORT.COMMENTS IMP SPEC: NORMAL
PATH REPORT.RELEVANT HX SPEC: NORMAL

## 2024-09-14 ENCOUNTER — HEALTH MAINTENANCE LETTER (OUTPATIENT)
Age: 24
End: 2024-09-14

## 2024-11-18 ENCOUNTER — MEDICAL CORRESPONDENCE (OUTPATIENT)
Dept: HEALTH INFORMATION MANAGEMENT | Facility: HOSPITAL | Age: 24
End: 2024-11-18

## 2024-11-26 ENCOUNTER — MYC REFILL (OUTPATIENT)
Dept: OBGYN | Facility: OTHER | Age: 24
End: 2024-11-26

## 2024-11-26 DIAGNOSIS — Z30.011 ENCOUNTER FOR INITIAL PRESCRIPTION OF CONTRACEPTIVE PILLS: ICD-10-CM

## 2024-11-27 RX ORDER — ACETAMINOPHEN AND CODEINE PHOSPHATE 120; 12 MG/5ML; MG/5ML
0.35 SOLUTION ORAL DAILY
Qty: 84 TABLET | Refills: 2 | Status: SHIPPED | OUTPATIENT
Start: 2024-11-27

## 2024-11-27 NOTE — TELEPHONE ENCOUNTER
Patient comment: I have 14 days left until i am in need of a more birth control:)     Contraceptives Protocol [id number]/26/2024 05:32 PM   Protocol Details Recent (12 mo) or future (90 days) visit within the authorizing provider's specialty        Patient was in for Office Visit on 8/22/24

## 2024-11-27 NOTE — TELEPHONE ENCOUNTER
norethindrone (MICRONOR) 0.35 MG tablet        Last Written Prescription Date:  8-1-24  Last Fill Quantity: 84,   # refills: 4  Last Office Visit: 8-  Future Office visit:       Routing refill request to provider for review/approval because:  Drug not on the [facility], [facility] or [facility] refill protocol or controlled substance  SHOULD HAVE REFILLS

## 2025-01-14 ENCOUNTER — MEDICAL CORRESPONDENCE (OUTPATIENT)
Dept: HEALTH INFORMATION MANAGEMENT | Facility: CLINIC | Age: 25
End: 2025-01-14

## 2025-01-25 ENCOUNTER — HOSPITAL ENCOUNTER (EMERGENCY)
Facility: HOSPITAL | Age: 25
Discharge: HOME | End: 2025-01-25
Attending: FAMILY MEDICINE
Payer: COMMERCIAL

## 2025-01-25 VITALS
TEMPERATURE: 98.5 F | DIASTOLIC BLOOD PRESSURE: 63 MMHG | HEART RATE: 62 BPM | RESPIRATION RATE: 16 BRPM | OXYGEN SATURATION: 99 % | SYSTOLIC BLOOD PRESSURE: 117 MMHG

## 2025-01-25 DIAGNOSIS — R45.851 SUICIDAL IDEATION: ICD-10-CM

## 2025-01-25 PROBLEM — F41.1 GENERALIZED ANXIETY DISORDER: Status: ACTIVE | Noted: 2025-01-25

## 2025-01-25 LAB
ALBUMIN SERPL BCG-MCNC: 4.8 G/DL (ref 3.5–5.2)
ALP SERPL-CCNC: 78 U/L (ref 40–150)
ALT SERPL W P-5'-P-CCNC: 24 U/L (ref 0–50)
AMPHETAMINES UR QL SCN: ABNORMAL
ANION GAP SERPL CALCULATED.3IONS-SCNC: 11 MMOL/L (ref 7–15)
APAP SERPL-MCNC: <5 UG/ML (ref 10–30)
AST SERPL W P-5'-P-CCNC: 22 U/L (ref 0–45)
BARBITURATES UR QL SCN: ABNORMAL
BASOPHILS # BLD AUTO: 0.1 10E3/UL (ref 0–0.2)
BASOPHILS NFR BLD AUTO: 1 %
BENZODIAZ UR QL SCN: ABNORMAL
BILIRUB DIRECT SERPL-MCNC: <0.2 MG/DL (ref 0–0.3)
BILIRUB SERPL-MCNC: 0.4 MG/DL
BUN SERPL-MCNC: 7.9 MG/DL (ref 6–20)
BZE UR QL SCN: ABNORMAL
CALCIUM SERPL-MCNC: 9.2 MG/DL (ref 8.8–10.4)
CANNABINOIDS UR QL SCN: ABNORMAL
CHLORIDE SERPL-SCNC: 110 MMOL/L (ref 98–107)
CREAT SERPL-MCNC: 0.77 MG/DL (ref 0.51–0.95)
EGFRCR SERPLBLD CKD-EPI 2021: >90 ML/MIN/1.73M2
EOSINOPHIL # BLD AUTO: 0 10E3/UL (ref 0–0.7)
EOSINOPHIL NFR BLD AUTO: 0 %
ERYTHROCYTE [DISTWIDTH] IN BLOOD BY AUTOMATED COUNT: 13.2 % (ref 10–15)
ETHANOL SERPL-MCNC: 0.13 G/DL
FENTANYL UR QL: ABNORMAL
GLUCOSE SERPL-MCNC: 102 MG/DL (ref 70–99)
HCG UR QL: NEGATIVE
HCO3 SERPL-SCNC: 27 MMOL/L (ref 22–29)
HCT VFR BLD AUTO: 36.5 % (ref 35–47)
HGB BLD-MCNC: 12.4 G/DL (ref 11.7–15.7)
IMM GRANULOCYTES # BLD: 0 10E3/UL
IMM GRANULOCYTES NFR BLD: 0 %
LYMPHOCYTES # BLD AUTO: 1.1 10E3/UL (ref 0.8–5.3)
LYMPHOCYTES NFR BLD AUTO: 23 %
MCH RBC QN AUTO: 29.7 PG (ref 26.5–33)
MCHC RBC AUTO-ENTMCNC: 34 G/DL (ref 31.5–36.5)
MCV RBC AUTO: 87 FL (ref 78–100)
MONOCYTES # BLD AUTO: 0.4 10E3/UL (ref 0–1.3)
MONOCYTES NFR BLD AUTO: 8 %
NEUTROPHILS # BLD AUTO: 3.1 10E3/UL (ref 1.6–8.3)
NEUTROPHILS NFR BLD AUTO: 67 %
NRBC # BLD AUTO: 0 10E3/UL
NRBC BLD AUTO-RTO: 0 /100
OPIATES UR QL SCN: ABNORMAL
PCP QUAL URINE (ROCHE): ABNORMAL
PLATELET # BLD AUTO: 261 10E3/UL (ref 150–450)
POTASSIUM SERPL-SCNC: 3.8 MMOL/L (ref 3.4–5.3)
PROT SERPL-MCNC: 7.3 G/DL (ref 6.4–8.3)
RBC # BLD AUTO: 4.18 10E6/UL (ref 3.8–5.2)
SALICYLATES SERPL-MCNC: <0.3 MG/DL
SODIUM SERPL-SCNC: 148 MMOL/L (ref 135–145)
TSH SERPL DL<=0.005 MIU/L-ACNC: 2.81 UIU/ML (ref 0.3–4.2)
WBC # BLD AUTO: 4.6 10E3/UL (ref 4–11)

## 2025-01-25 PROCEDURE — 80307 DRUG TEST PRSMV CHEM ANLYZR: CPT | Performed by: FAMILY MEDICINE

## 2025-01-25 PROCEDURE — 80143 DRUG ASSAY ACETAMINOPHEN: CPT | Performed by: FAMILY MEDICINE

## 2025-01-25 PROCEDURE — 82077 ASSAY SPEC XCP UR&BREATH IA: CPT | Performed by: FAMILY MEDICINE

## 2025-01-25 PROCEDURE — 80048 BASIC METABOLIC PNL TOTAL CA: CPT | Performed by: FAMILY MEDICINE

## 2025-01-25 PROCEDURE — 82565 ASSAY OF CREATININE: CPT | Performed by: FAMILY MEDICINE

## 2025-01-25 PROCEDURE — 85004 AUTOMATED DIFF WBC COUNT: CPT | Performed by: FAMILY MEDICINE

## 2025-01-25 PROCEDURE — 36415 COLL VENOUS BLD VENIPUNCTURE: CPT | Performed by: FAMILY MEDICINE

## 2025-01-25 PROCEDURE — 81025 URINE PREGNANCY TEST: CPT | Performed by: FAMILY MEDICINE

## 2025-01-25 PROCEDURE — 99284 EMERGENCY DEPT VISIT MOD MDM: CPT | Performed by: FAMILY MEDICINE

## 2025-01-25 PROCEDURE — 80179 DRUG ASSAY SALICYLATE: CPT | Performed by: FAMILY MEDICINE

## 2025-01-25 PROCEDURE — 99285 EMERGENCY DEPT VISIT HI MDM: CPT

## 2025-01-25 PROCEDURE — 85041 AUTOMATED RBC COUNT: CPT | Performed by: FAMILY MEDICINE

## 2025-01-25 PROCEDURE — 84443 ASSAY THYROID STIM HORMONE: CPT | Performed by: FAMILY MEDICINE

## 2025-01-25 PROCEDURE — 84450 TRANSFERASE (AST) (SGOT): CPT | Performed by: FAMILY MEDICINE

## 2025-01-25 PROCEDURE — 82248 BILIRUBIN DIRECT: CPT | Performed by: FAMILY MEDICINE

## 2025-01-25 ASSESSMENT — COLUMBIA-SUICIDE SEVERITY RATING SCALE - C-SSRS
3. HAVE YOU BEEN THINKING ABOUT HOW YOU MIGHT KILL YOURSELF?: NO
4. HAVE YOU HAD THESE THOUGHTS AND HAD SOME INTENTION OF ACTING ON THEM?: NO
2. HAVE YOU ACTUALLY HAD ANY THOUGHTS OF KILLING YOURSELF IN THE PAST MONTH?: YES
6. HAVE YOU EVER DONE ANYTHING, STARTED TO DO ANYTHING, OR PREPARED TO DO ANYTHING TO END YOUR LIFE?: YES
1. IN THE PAST MONTH, HAVE YOU WISHED YOU WERE DEAD OR WISHED YOU COULD GO TO SLEEP AND NOT WAKE UP?: YES
5. HAVE YOU STARTED TO WORK OUT OR WORKED OUT THE DETAILS OF HOW TO KILL YOURSELF? DO YOU INTEND TO CARRY OUT THIS PLAN?: NO

## 2025-01-25 ASSESSMENT — ACTIVITIES OF DAILY LIVING (ADL)
ADLS_ACUITY_SCORE: 50

## 2025-01-25 NOTE — ED NOTES
Face to face report given with opportunity to observe patient.    Report given to YESSICA Vann RN   1/25/2025  7:08 AM

## 2025-01-25 NOTE — DISCHARGE INSTRUCTIONS
[facility] SCHEDULING:  Today you were seen by a licensed mental health professional through [facility] ([facility])  for a crisis assessment in the Emergency Department at [facility].  It is recommended that you follow up with your estabished providers (psychiatrist, mental health therapist, and/or primary care doctor - as relevant) as soon as possible. Coordinators from [facility] will be calling you in the next 24-48 hours to ensure that you have the resources you need.  You can also contact [facility] coordinators directly at [phone].    Transition clinic will contact you for medication management appointment via phone. Therapy appointment is schedule for Tuesday 28 th at 11 this is a virtual appointment.      [facility] maintains an extensive network of licensed behavioral health providers to connect patients with the services they need.  We do not charge providers a fee to participate in our referral network.  We match patients with providers based on a patient s specific needs, insurance coverage, and location.  Our first effort will be to refer you to a provider within your care system, and will utilize providers outside your care system as needed.       Aftercare Plan      [facility] ([facility])     [phone]  *This clinic has several locations, mostly in the  area. However, they have several providers who specialize in pre/post-partum issues. They are incredibly responsive, have a great virtual appointment dynamic and usually have appointments within 3 weeks.    Local clinics:    [facility] [place]  [phone]    [facility]  [phone]    [facility]  [phone]  [phone]    [facility]  [phone]    [facility]  [phone]            If I am feeling unsafe or I am in a crisis, I will:   Contact my established care providers   Call  the National Suicide Prevention Lifeline: 347.901.9273   Go to the nearest emergency room   Call 911     Warning signs that I or other people might notice when a crisis is developing for me:     I am having increasing suicidal thoughts that turn to plans with intent or means  I am having additional urges to self-harm    My emotions are of hopelessness; feeling like there's no way out.  Rage or anger.  Engaging in risky activities without thinking  Withdrawing from family/friends  Dramatic mood swings  Drastic personality changes   Use of alcohol or drugs  Postings on social media  Neglect of personal hygiene or cares     Things I am able to do on my own to cope or help me feel better:    Spending quality time with loved ones  Staying hydrated  Eating balanced meals  Going for a walk every day  Take care of daily responsibilities/needs  Focus on positive self-talk vs negative self-talk    Things that I am able to do with others to cope or help me better:   Music  Deep breathing  Meditations  Journal  Self-regulate  Self check-in  Ask for help  Exercise    Things I can use or do for distraction:   Reach out to/spend time with family, friends  Shower  Exercise  Chores or do a project  Listen to music  Watch movie/TV  Listening to music  Journaling  Reading a book  Meditating  Call a friend    Changes I can make to support my mental health and wellness:    -I will abstain from all mood altering chemicals not currently prescribed to me   -I will attend scheduled mental health therapy and psychiatric appointments and follow all   recommendations  -I will commit to 30 minutes of self care daily - this can be as simple as taking a shower, going for a walk, cooking a meal, read, writing, etc  -I will practice square breathing when I begin to feel anxious - in breath through the nose for the count   of 4 and the first line on the square. Out breath through the mouth for the count of 4 for the second line   of the square.  Repeat to complete the square. Repeat the square as many times as needed.  - I will use distraction skills of: going for walks, watching TV, spending time outside, calling a friend or family member  -Use community resources, including hotline numbers, [facility] crisis and support meetings  -Maintain a daily schedule/routine  -Practice deep breathing skills  -Download a meditation benoit and spend 15-20 minutes per day mediating/relaxing. Some apps to   download include: Calm, Headspace and Insight Timer. All 3 of these apps have free version    Reduce Extreme Emotion  QUICKLY:  Changing Your Body Chemistry      T:  Change your body Temperature to change your autonomic nervous system   Use Ice Water to calm yourself down FAST   Put your face in a bowl of ice water (this is the best way; have the person keep his/her face in ice water for 30-45 seconds - initial research is showing that the longer s/he can hold her/his face in the water, the better the response), or   Splash ice water on your face, or hold an ice pack on your face      I:  Intensely exercise to calm down a body revved up by emotion   Examples: running, walking fast, jumping, playing basketball, weight lifting, swimming, calisthenics, etc.   Engage in exercises that DO NOT include violent behaviors. Exercises that utilize violent behaviors tend to function as  behavioral rehearsal,  and rather than calming the person down, may actually  rev  the person up more, increasing the likelihood of violence, and lessening the likelihood that they will  burn off  energy     P:  Progressively relax your muscles   Starting with your hands, moving to your forearms, upper arms, shoulders, neck, forehead, eyes, cheeks and lips, tongue and teeth, chest, upper back, stomach, buttocks, thighs, calves, ankles, feet   Tense (10 seconds,   of the way), then relax each muscle (all the way)   Notice the tension   Notice the difference when relaxed (by tensing first, and then  relaxing, you are able to get a more thorough relaxation than by simply relaxing)      P: Paced breathing to relax   The standard technique is to begin with counting the number of steps one takes for a typical inhale, then counting the steps one takes for a typical exhale, and then lengthening the amount of steps for the exhalation by one or two steps.  OR  Repeat this pattern for 1-2 minutes  Inhale for four (4) seconds   Exhale for six (6) to eight (8) seconds   Research demonstrated that one can change one's overall level of anxiety by doing this exercise for even a few minutes per day      People in my life that I can ask for help:   Family  Friends  Providers    Your [facility] has a mental health crisis team you can call 24/7:   [facility] Crisis Line Number: [phone]  [facility] Mental Health Crisis: [phone]   [facility] Crisis Line Number: [phone]  [facility] Crisis Line Number: [phone]  [facility] Crisis Line Number: [phone]   [facility] Crisis Line Number: [phone]  [place]: [phone]  [place]: [phone]  [facility] Crisis Number: 1-941.435.4223  [facility] Crisis: [phone]  [facility] Crisis: 1-939.350.1589  [facility]: [phone]  [facility] Crisis: [phone]    Other things that are important when I'm in crisis:   Ask for help    Additional resources and information:     Mental Health Apps  My3  https://myBangopp.org/    VirtualHopeBox  https://UniversityNow.org/apps/virtual-hope-box/       Professionals or Agencies I Can Contact During A Crisis:       Crisis Lines  Call or Text 370 - National Suicide and Crisis Lifeline    Crisis Text Line  Text 657698  You will be connected with a trained live crisis counselor to provide support.    The [employer] (LGBTQ Youth Crisis Line)  3.470.628.9364  text START to 919-963    National [place] on Mental Illness  "(JOSELO)  894.480.5428 or 7.701.JOSELO.HELPS    National Suicide Prevention Lifeline at 0-437-630-QGOZ (5137)     Throughout  [place]: call **CRISIS (**880863)     Crisis Text Line: is available for free, 24/7 by texting [place] to 650117    [place]  Fast Tracker  Linking people to mental health and substance use disorder resources  [employer]     [place] Mental Health Warm Line  Peer to peer support  Monday thru Saturday, 12 pm to 10 pm  312.843.7653 or 2.202.953.1594  Text \"Support\" to 16864     National [place] on Mental Illness (www.[place].joselo.org): 581.781.8735 or 073-557-4664     [facility] Phone (free remote counseling): 665.871.8207 Web address:   [email or web address]/     www.Neitui (filter for insurance, gender preference, etc.)    [facility]   [phone]  Intake appointment will be virtual, following appointments can be in person or virtual.   **IMMEDIATE OPENINGS**    [facility]  [phone]  *offers individual therapy, medication management and Mental Health Case Workers; can self refer    [facility]  [phone]  *Immediate Openings    [facility]  [phone]  *Immediate Openings    [facility]  [phone]  *Immediate Openings    Please follow up with scheduled providers to ensure all necessary paperwork is filled out prior to your   scheduled telehealth appointments.     Coordinators from [facility] will be calling within two business days to ensure   that you have the resources you may need or provide assistance with scheduling (Phone number: [phone].).    Remember: give the referrals 3 sessions prior to calling it quits. Do you trust them? Do you feel   understood? Do you think they can help? Check in with yourself after each session    Please reach out to the [facility]([phone]) regarding further mental health " appointment needs for this emergency department visit.    [facility] SCHEDULING:  Today you were seen by a licensed mental health professional through [facility] ([facility])  for a crisis assessment in the Emergency Department at [facility].  It is recommended that you follow up with your estabished providers (psychiatrist, menta health therapist, and/or primary care doctor - as relevant) as soon as possible. Coordinators from [facility] will be calling you in the next 24-48 hours to ensure that you have the resources you need.  You can so contact [facility] coordinators directly at [phone].     [facility] maintains an extensive network of licensed behavioral health providers to connect patients with the services they need.  We do not charge providers a fee to participate in our referral network.  We match patients with providers based on a patient s specific needs, insurance coverage, and location.  Our first effort will be to refer you to a provider within your care system, and will utilize providers outside your care system as needed.
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
## 2025-01-25 NOTE — ED NOTES
Presents via [place] [employer]. Police stated that someone called police due to a person breaking out a window in their home. PD followed the patients tracks until they found her. Patient told PD that she had been out wandering since about 0000 this evening. Was tearful, saying she's not ok mentally. Was cooperative for PD. When PD arrived, patient stated she had been having suicidal thoughts but no plan and no attempt since >6 months ago. Denies drug/ ETOH use other than social ETOH and THC.

## 2025-01-25 NOTE — PLAN OF CARE
Neisha HUGO Dusty  January 25, 2025  Plan of Care Hand-off Note     Patient Recommended Care Path: discharge    Clinical Substantiation:  After therapeutic assessment, intervention and aftercare planning by ED care team and LMHP and in consultation with attending provider, the patient's circumstances and mental state were appropriate for outpatient management. It is the recommendation of this clinician that pt discharge with OP MH support. At this time the pt is not presenting as an acute risk to self or others due to the following factors: Pt presents after having left her home when intoxicated and was noted to be talking about characters in [employer]. Pt behavior appears to be connected to her marijuana and alcohol use, as she notes no history of hallucinations or delusions. Pt, additionally, appears to be struggling with post-partum depression and anxiety symptoms and flashbacks related to a trauma. Pt denies SI/SIB/SA/HI and denies plans, means, or intent to harm herself or others. Pt denies hallucinations or delusions and does not appear to be responding to internal stimuli. Pt was scheduled for therapy and medication management services, information was listed in AVS.    Goals for crisis stabilization:  Pt will discharge to OP services.    Next steps for Care Team:  pt will discharge. Pt scheduled for medication and therapy appointments, with [facility]. Appt information listed in AVS.    Treatment Objectives Addressed:  rapport building, assessing safety, building self-esteem, orienting the patient to therapy, identifying and practicing coping strategies, identifying additional supports, exploring obstacles to safety in the community, processing feelings, safety planning, identifying an appropriate aftercare plan    Therapeutic Interventions:  Discussed and practiced mindfulness., Engaged in safety planning, Engaged in guided discovery, explored patient's perspectives and helped expand them through  socratic dialogue., Reviewed healthy living that supports positive mental health, including looking at sleep hygiene, regular movement, nutrition, and regular socialization.    Has a specific means been identified for suicidal.homicide actions: No  If yes, describe:    Explain action steps toward mitigation:    Document completion of mitigation action:    The follow up action still needed prior to discharge:      Patient coping skills attempted to reduce the crisis:  talked to family, help seeking                          Collateral contact information:  Jewell Talbert (Mother) [phone] -- [phone]    Legal Status: Voluntary/Patient has signed consent for treatment                                                                                                                                 Reviewed court records: yes     Psychiatry Consult:     CHERRI Tello

## 2025-01-25 NOTE — ED PROVIDER NOTES
History     Chief Complaint   Patient presents with    Psychiatric Evaluation     HPI  Neisha Montano is a 24 year old female who presents by law enforcement for mental health evaluation.  She has been walking around outside in winter since about midnight.  She had a jacket and was dressed with shoes and socks; however, she is cold.  She feels overwhelmed and having suicidal ideation for a while.  She had a baby last July.  No hx of being on anti-depressant medication or talking with a therapist. Family history of bipolar.     Patient admits to alcohol and THC.       Allergies:  No Known Allergies    Problem List:    Patient Active Problem List    Diagnosis Date Noted    Normal labor 07/10/2024     Priority: Medium    Encounter for triage in pregnant patient 07/08/2024     Priority: Medium        Past Medical History:    No past medical history on file.    Past Surgical History:    No past surgical history on file.    Family History:    Family History   Problem Relation Age of Onset    Asthma No family hx of     Diabetes No family hx of     Hypertension No family hx of        Social History:  Marital Status:  Single [1]  Social History     Tobacco Use    Smoking status: Every Day     Types: Vaping Device    Smokeless tobacco: Never   Substance Use Topics    Alcohol use: No    Drug use: Yes     Types: Marijuana        Medications:    calcium carbonate 750 MG CHEW  norethindrone (MICRONOR) 0.35 MG tablet  Prenatal Vit-Fe Sulfate-FA-DHA (PRENATAL VITAMIN/MIN +DHA) 27-0.8-200 MG CAPS          Review of Systems  See HPI; otherwise neg     Physical Exam   BP: 119/71  Pulse: 62  Temp: 99.1  F (37.3  C)  Resp: 16  SpO2: 99 %    Physical Exam  GENERAL APPEARANCE: flat affect, tearful  EYES: PERRL, conjunctiva clear  HEENT:  Atraumatic, normocephalic.   NECK: supple, no lymphadenopathy  RESP: lungs are clear to auscultation - no rales, rhonchi or wheezes  CV: regular rates and rhythm, no murmur noted  ABDOMEN:  soft,  nontender, not distended, no rebound or guarding  NEURO: A &O, no focal deficit  MS: no muscular asymmetry   Psych: depressed affect   SKIN: no suspicious lesions or rashes on visible skin, no frostbite.     ED Course  Mom in the room with her.  DEC consult will be after 7 am.   Patient is agreeable to labs and urine.      ED Course as of 01/25/25 1137   Sat Jan 25, 2025   0658 SOR 24F with SI pending DEC. NO plan/intent/attempt. NO psychosis/paranoia/delusions   0736 I have examined the patient she is calm cooperative, pleasant.  Awaiting DEC   0742 Labs largely unremarkable positive alcohol consistent with patient history   0843 DEC: OK for discharge. Not psychotic. Likely too much THC and EtOH. Some post-partum element probable. Therapy and medications follow up.    1137 Patient discharged in stable condition       I have reviewed the nursing notes.              Results for orders placed or performed during the hospital encounter of 01/25/25 (from the past 24 hours)   CBC with platelets differential    Narrative    The following orders were created for panel order CBC with platelets differential.  Procedure                               Abnormality         Status                     ---------                               -----------         ------                     CBC with platelets and d...[[id number]]                      Final result                 Please view results for these tests on the individual orders.   Basic metabolic panel   Result Value Ref Range    Sodium 148 (H) 135 - 145 mmol/L    Potassium 3.8 3.4 - 5.3 mmol/L    Chloride 110 (H) 98 - 107 mmol/L    Carbon Dioxide (CO2) 27 22 - 29 mmol/L    Anion Gap 11 7 - 15 mmol/L    Urea Nitrogen 7.9 6.0 - 20.0 mg/dL    Creatinine 0.77 0.51 - 0.95 mg/dL    GFR Estimate >90 >60 mL/min/1.73m2    Calcium 9.2 8.8 - 10.4 mg/dL    Glucose 102 (H) 70 - 99 mg/dL   Hepatic panel   Result Value Ref Range    Protein Total 7.3 6.4 - 8.3 g/dL    Albumin 4.8 3.5 - 5.2  g/dL    Bilirubin Total 0.4 <=1.2 mg/dL    Alkaline Phosphatase 78 40 - 150 U/L    AST 22 0 - 45 U/L    ALT 24 0 - 50 U/L    Bilirubin Direct <0.20 0.00 - 0.30 mg/dL   Alcohol ethyl   Result Value Ref Range    Alcohol ethyl 0.13 (H) <=0.01 g/dL   TSH   Result Value Ref Range    TSH 2.81 0.30 - 4.20 uIU/mL   Acetaminophen level   Result Value Ref Range    Acetaminophen <5.0 (L) 10.0 - 30.0 ug/mL   Salicylate level   Result Value Ref Range    Salicylate <0.3   mg/dL   CBC with platelets and differential   Result Value Ref Range    WBC Count 4.6 4.0 - 11.0 10e3/uL    RBC Count 4.18 3.80 - 5.20 10e6/uL    Hemoglobin 12.4 11.7 - 15.7 g/dL    Hematocrit 36.5 35.0 - 47.0 %    MCV 87 78 - 100 fL    MCH 29.7 26.5 - 33.0 pg    MCHC 34.0 31.5 - 36.5 g/dL    RDW 13.2 10.0 - 15.0 %    Platelet Count 261 150 - 450 10e3/uL    % Neutrophils 67 %    % Lymphocytes 23 %    % Monocytes 8 %    % Eosinophils 0 %    % Basophils 1 %    % Immature Granulocytes 0 %    NRBCs per 100 WBC 0 <1 /100    Absolute Neutrophils 3.1 1.6 - 8.3 10e3/uL    Absolute Lymphocytes 1.1 0.8 - 5.3 10e3/uL    Absolute Monocytes 0.4 0.0 - 1.3 10e3/uL    Absolute Eosinophils 0.0 0.0 - 0.7 10e3/uL    Absolute Basophils 0.1 0.0 - 0.2 10e3/uL    Absolute Immature Granulocytes 0.0 <=0.4 10e3/uL    Absolute NRBCs 0.0 10e3/uL   Urine Drug Screen    Narrative    The following orders were created for panel order Urine Drug Screen.  Procedure                               Abnormality         Status                     ---------                               -----------         ------                     Urine Drug Screen Panel[[id number]]      Abnormal            Final result                 Please view results for these tests on the individual orders.   HCG qualitative urine   Result Value Ref Range    hCG Urine Qualitative Negative Negative   Urine Drug Screen Panel   Result Value Ref Range    Amphetamines Urine Screen Negative Screen Negative    Barbituates Urine Screen  Negative Screen Negative    Benzodiazepine Urine Screen Negative Screen Negative    Cannabinoids Urine Screen Positive (A) Screen Negative    Cocaine Urine Screen Negative Screen Negative    Fentanyl Qual Urine Screen Negative Screen Negative    Opiates Urine Screen Negative Screen Negative    PCP Urine Screen Negative Screen Negative       Medications - No data to display    Assessments & Plan (with Medical Decision Making): 23 yo female who presents for mental health evaluation.  She has been wondering around outside for hours feeling overwhelmed and having suicidal thoughts for a while.    On exam, she is conversive; however, flat affect and poor eye contact, and tearful.  Remainder of exam is normal and no evidence of frostbite.   DEC accessment ordered, along with labs.   Patient signed out to ER physician at change of shift.        Diagnosis  Mental health evaluation.           1/25/2025   [facility]       Kandis Arciniega MD  01/25/25 0611       Arcenio Thompson MD  01/25/25 0271

## 2025-01-25 NOTE — ED NOTES
"Mother reports patient's boyfriend Brandi had called her about [phone] saying that the patient had left on foot from their home. Said that patient had been taking about some \"Magdalene\" that was a person that doesn't exist and some other strange things about \"someone being from royal bloodlines or family\". Mother states that she was in the background of one of the phone calls to her at some point during the night but patient states she left the home at 0000, not 0400, so unknown how long she was outside.   "

## 2025-01-27 ENCOUNTER — TELEPHONE (OUTPATIENT)
Dept: BEHAVIORAL HEALTH | Facility: CLINIC | Age: 25
End: 2025-01-27

## 2025-01-27 ASSESSMENT — ANXIETY QUESTIONNAIRES
2. NOT BEING ABLE TO STOP OR CONTROL WORRYING: NEARLY EVERY DAY
7. FEELING AFRAID AS IF SOMETHING AWFUL MIGHT HAPPEN: MORE THAN HALF THE DAYS
3. WORRYING TOO MUCH ABOUT DIFFERENT THINGS: NEARLY EVERY DAY
6. BECOMING EASILY ANNOYED OR IRRITABLE: SEVERAL DAYS
8. IF YOU CHECKED OFF ANY PROBLEMS, HOW DIFFICULT HAVE THESE MADE IT FOR YOU TO DO YOUR WORK, TAKE CARE OF THINGS AT HOME, OR GET ALONG WITH OTHER PEOPLE?: VERY DIFFICULT
GAD7 TOTAL SCORE: 15
GAD7 TOTAL SCORE: 15
5. BEING SO RESTLESS THAT IT IS HARD TO SIT STILL: SEVERAL DAYS
7. FEELING AFRAID AS IF SOMETHING AWFUL MIGHT HAPPEN: MORE THAN HALF THE DAYS
4. TROUBLE RELAXING: MORE THAN HALF THE DAYS
GAD7 TOTAL SCORE: 15
1. FEELING NERVOUS, ANXIOUS, OR ON EDGE: NEARLY EVERY DAY
IF YOU CHECKED OFF ANY PROBLEMS ON THIS QUESTIONNAIRE, HOW DIFFICULT HAVE THESE PROBLEMS MADE IT FOR YOU TO DO YOUR WORK, TAKE CARE OF THINGS AT HOME, OR GET ALONG WITH OTHER PEOPLE: VERY DIFFICULT

## 2025-01-27 ASSESSMENT — PATIENT HEALTH QUESTIONNAIRE - PHQ9
SUM OF ALL RESPONSES TO PHQ QUESTIONS 1-9: 9
SUM OF ALL RESPONSES TO PHQ QUESTIONS 1-9: 9
10. IF YOU CHECKED OFF ANY PROBLEMS, HOW DIFFICULT HAVE THESE PROBLEMS MADE IT FOR YOU TO DO YOUR WORK, TAKE CARE OF THINGS AT HOME, OR GET ALONG WITH OTHER PEOPLE: SOMEWHAT DIFFICULT

## 2025-01-27 NOTE — TELEPHONE ENCOUNTER
Appointment and screener reminder, patient confirmed.    Joanna Ventura  Transition Clinic Coordinator  01/27/25 2:04 PM

## 2025-01-28 ENCOUNTER — TELEPHONE (OUTPATIENT)
Dept: BEHAVIORAL HEALTH | Facility: CLINIC | Age: 25
End: 2025-01-28

## 2025-01-28 ENCOUNTER — VIRTUAL VISIT (OUTPATIENT)
Dept: BEHAVIORAL HEALTH | Facility: CLINIC | Age: 25
End: 2025-01-28

## 2025-01-28 DIAGNOSIS — F41.1 GENERALIZED ANXIETY DISORDER: Primary | ICD-10-CM

## 2025-01-28 PROCEDURE — 99207 PR NO CHARGE LOS: CPT | Mod: 95 | Performed by: PSYCHOLOGIST

## 2025-01-28 NOTE — TELEPHONE ENCOUNTER
Writer spoke with provider and scheduled TC psychiatry appointment on 02/03/2025 @ 2:30 pm. Long term referral sent for psychiatry appointment.    Roseline Cooley  01/27/2025  [phone]

## 2025-01-28 NOTE — TELEPHONE ENCOUNTER
Writer spoke with pt and provided info to schedule with provider.    Sowmya Serrato  MSN  CNP,RN Therapist [address]     [phone]    Roseline Cooley  01/27/2025  123
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
## 2025-02-02 ASSESSMENT — PATIENT HEALTH QUESTIONNAIRE - PHQ9
10. IF YOU CHECKED OFF ANY PROBLEMS, HOW DIFFICULT HAVE THESE PROBLEMS MADE IT FOR YOU TO DO YOUR WORK, TAKE CARE OF THINGS AT HOME, OR GET ALONG WITH OTHER PEOPLE: VERY DIFFICULT
SUM OF ALL RESPONSES TO PHQ QUESTIONS 1-9: 12
SUM OF ALL RESPONSES TO PHQ QUESTIONS 1-9: 12

## 2025-02-03 ENCOUNTER — VIRTUAL VISIT (OUTPATIENT)
Dept: BEHAVIORAL HEALTH | Facility: CLINIC | Age: 25
End: 2025-02-03
Payer: COMMERCIAL

## 2025-02-03 DIAGNOSIS — F41.1 GAD (GENERALIZED ANXIETY DISORDER): Primary | ICD-10-CM

## 2025-02-03 RX ORDER — BUSPIRONE HYDROCHLORIDE 5 MG/1
5 TABLET ORAL 2 TIMES DAILY
Qty: 14 TABLET | Refills: 0 | Status: SHIPPED | OUTPATIENT
Start: 2025-02-03

## 2025-02-03 RX ORDER — BUSPIRONE HYDROCHLORIDE 10 MG/1
10 TABLET ORAL 2 TIMES DAILY
Qty: 60 TABLET | Refills: 1 | Status: SHIPPED | OUTPATIENT
Start: 2025-02-03

## 2025-02-03 ASSESSMENT — PAIN SCALES - GENERAL: PAINLEVEL_OUTOF10: NO PAIN (0)

## 2025-02-03 NOTE — NURSING NOTE
Current patient location: [address] [place] [address] E  [place] [address]    Is the patient currently in the state of [place]? YES    Visit mode: VIDEO    If the visit is dropped, the patient can be reconnected by:VIDEO VISIT: Text to cell phone:   Telephone Information:   Mobile [phone]       Will anyone else be joining the visit? NO  (If patient encounters technical issues they should call [phone]    Are changes needed to the allergy or medication list? No    Are refills needed on medications prescribed by this physician? Discuss with provider    Rooming Documentation:  Questionnaire(s) completed    Reason for visit: Consult    Walter OBRIEN

## 2025-02-03 NOTE — PATIENT INSTRUCTIONS
At the time of  please review: 5 mg BID x 7 days then increase to 10 mg twice daily    --------------------      -If there are questions/inquiries between now and the next appointment OR prior to transferring to the next level of care, please call (see below).    Clinic hours/phone number at the Transition Clinic:   -Monday to Friday from 8:00 am to 4:30 pm  -[phone]    -----------------------    Call:  [phone] ([phone]) if guidance is needed after T.C. clinic hours about utilizing [facility] versus the Emergency Departments    ----------------------    Any time (during or after regular clinic hours) immediate and or life threatening symptoms occur (either medical or mental health), call:  [phone] and or go to the nearest Emergency Department      ---------------------    Please use the following websites to obtain a comprehensive list of all counties within the state of [place] for adult and child mental health crisis numbers:    https://[place].[facility]/dhs/people-we-serve/people-with-disabilities/health-care/adult-mental-health/resources/crisis-contacts.jsp    https://[place].gov/dhs/people-we-serve/people-with-disabilities/health-care/childrens-mental-health/resources/crisis-contacts.jsp      -------------------      Below is a list of county (also found on the above websites), state, and national crisis numbers.   These resources can provide real-time assistance if experiencing moderate to severe mental health symptoms.        Additionally, please visit your county website to find the most up-to-date list of local:  adult, child, family, and chemical dependency services available.        [facility]  [phone]    [facility]  [phone]    [facility]  [phone]    [facility]  [phone]    [facility]  [phone]: Adults 18 and over    [phone]: Children 17 and under    [facility] ([facility]), Acute Psychiatric Services (APS) Assessment &  Referral:   [phone]    Community Outreach for Psychiatric Emergencies (COPE):  [phone]    [facility]  [phone]: Adults 18 and over    [phone]: Children 17 and under      Walk-in crisis services are available Monday to Friday from 8 am to 5:30 pm at Urgent Care for  Adult metal health:    [address] [place] [address]  Closed on Saturday, Sunday, and holidays    [place]  [phone]: Toll free    [facility]  [phone]      [facility] [place]  [phone]: Toll free    Text: [place] to 974722      [facility] services  Call 211 or visit https://www.211un[place]way.org to obtain free and confidential h/hs information     [place] [facility]  If you are an adult needing support, you can talk to a specialist who has first hand experience living with a mental health condition:    [phone]    Text: Support to 91615    [facility] [place]:  domestic violence/LGBTQ+  [phone] option 3    [employer]: LGBTQ+  2-194-771-0934    Text: START to 886345     Resources  0-113-LbksHef: (1-674.798.9265)    Crisis line: 1-644.297.8656    Text: 042904    [facility] [facility]: women and sexually exploited youth  [phone]    [employer] Domestic Violence Shelter Hotline  [phone]    National Suicide Prevention Lifeline  982    National Youth Crisis Hotline  383
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
## 2025-03-10 ENCOUNTER — TELEPHONE (OUTPATIENT)
Dept: BEHAVIORAL HEALTH | Facility: CLINIC | Age: 25
End: 2025-03-10
Payer: COMMERCIAL

## 2025-03-10 ENCOUNTER — MYC REFILL (OUTPATIENT)
Dept: BEHAVIORAL HEALTH | Facility: CLINIC | Age: 25
End: 2025-03-10
Payer: COMMERCIAL

## 2025-03-10 DIAGNOSIS — F41.1 GAD (GENERALIZED ANXIETY DISORDER): ICD-10-CM

## 2025-03-10 RX ORDER — BUSPIRONE HYDROCHLORIDE 10 MG/1
10 TABLET ORAL 2 TIMES DAILY
Qty: 60 TABLET | Refills: 1 | Status: CANCELLED | OUTPATIENT
Start: 2025-03-10

## 2025-03-10 NOTE — TELEPHONE ENCOUNTER
Appointment reminder voicemail.    Joanna Ventura  Transition Clinic Coordinator  03/10/25 1:39 PM

## 2025-03-10 NOTE — PROGRESS NOTES
[facility]      Mental Health & Addiction Service Line    Transition Clinic: Psychiatry Progress Note  Medication Management              ASSESSMENT/PLAN    Medications:    Buspirone/Buspar:  -Continue: 10 mg twice daily until back from [place] (approx March 23rd)  -Increase:  15 mg twice daily thereafter      Labs:  -None ordered      Therapy and or Non-pharmacological modalities:  -Referrals placed in [facility] for individual therapy at [facility] + long term within [facility]        Sleep:  -Get 7+ hours per night  -Avoid screens 60 minutes prior to bedtime    Nutrition:  -Anti-inflammatory diet: fruits, veggies, grains, plant proteins, lean animal protein (sparingly), dairy (small amounts)  -Omega 3's + fiber: food = first choice, supplements = second choice  -Avoid excessive amounts of: refined sugars + carbs, fried + fast foods    Activity:  -30 to 90 minutes (doesn't have to be consecutive) most days of the week  -Aerobic + strength/weight bearing  -Yoga + meditation/deep breathing      Disposition:  -Has been advised of consultative Transition Clinic model    -Please follow up at the Transition Clinic for medication management in:   4 to 6 weeks          Summary of Clinical Impressions:    Neisha Montano is a 26 y/o female with a prior history of:  -Trauma/abuse  -Daily Cannabis Use     She went to the Emergency Department on 1/25/2025 in the context of experiencing: psychosis symptoms   (delusions, bizarre behaviors = wandering around, confusion) and suicidal ideations while using substances:   alcohol + THC.     Additionally, Neisha had her first baby/son: Francois, 8 months ago.  She is constantly anxious, worried, overwhelmed   with taking care of his needs and is often fearful when away from him for to long.     She initiated care at the Transition Clinic on 2/3/2025 for the management of psychotropics/bridging until able to   establish long term outpatient psychiatric services.    --------------------    Neisha franks  she has been working on decreasing THC use (previously daily, now 3 to 4x/week).  She tried to set up individual therapy   however has been unable to do so (referrals placed again), and remains interested in starting counseling services.    She notes some minor but tolerable side effects from Buspar 10 mg twice a day and will increase to 15 mg twice daily (after returning from vacation/travel to [place]) to target residual anxiety symptoms.    Throughout the interview Neisha is friendly, conversational, attends to her infant appropriately, and denies any immediate safety concerns towards herself or others at this time.      DSM-V and or working diagnosis:    1.  ZOË     2.  Post Partum anxiety     3.  Nicotine Use Disorder     4.  Continuous Cannabis Use        Rule outs:     5.  MDD single episode versus recurrent with anxious distress      6.  Cannabis Use Disorder              SAFETY EVALUATION:  Suicidal ideations:  -denies  Homicidal ideations:  -denies  Access to guns:   -none reported  Risk factors:  -recent psychosis symptoms  -psychosocial stressors  Protective and mitigating factors:  -son, significant other  -no prior attempts  -willingness to engage in outpatient mental health symptoms  Risk assessment:  -low to medium         SAFETY PLAN:  -Has numbers of at least 1 family member + 1 friend in personal contact list  -Knows clinic number(s) + operation of regular business hours   -Reviewed to utilize 988 or text [place] to [phone] for mental health crisis  -Discussed to call 911 and or return to the nearest Emergency Department if unable to maintain safety of self or others (due to severity of   suicidal and or homicidal ideations)      PSYCHIATRIC HISTORY    Individual therapy or IOP:   -None reported      Suicide attempts:  -None reported      Inpatient psychiatric hospitalizations:  -None reported   -No hx of commitments      ECT:  -None reported            Medication Trials:  -None besides current  medication list        MEDICAL HISTORY  -The problem list was reviewed via the Electronic Medical Record (EMR) prior to the appointment  -The patient denies any concerning physical and or medical symptoms during the interviewing process        MEDICATIONS      Current Outpatient Medications:     busPIRone (BUSPAR) 10 MG tablet, Take 1 tablet (10 mg) by mouth 2 times daily., Disp: 60 tablet, Rfl: 1    busPIRone (BUSPAR) 5 MG tablet, Take 1 tablet (5 mg) by mouth 2 times daily., Disp: 14 tablet, Rfl: 0    calcium carbonate 750 MG CHEW, Take 750 mg by mouth daily (Patient not taking: Reported on 8/1/2024), Disp: , Rfl:     norethindrone (MICRONOR) 0.35 MG tablet, Take 1 tablet (0.35 mg) by mouth daily., Disp: 84 tablet, Rfl: 2    Prenatal Vit-Fe Sulfate-FA-DHA (PRENATAL VITAMIN/MIN +DHA) 27-0.8-200 MG CAPS, Take 1 tablet by mouth daily (Patient not taking: Reported on 8/1/2024), Disp: , Rfl:       If a controlled substance is prescribed during today's appointment:    -The [place] Prescription Monitoring Program has been reviewed and there are no current concerns with: diversionary activity, early refill requests, and or obtaining the medication from multiple providers.        VITALS    BP Readings from Last 3 Encounters:   01/25/25 117/63   08/22/24 114/72   08/01/24 108/64       Pulse Readings from Last 3 Encounters:   01/25/25 62   08/22/24 106   08/01/24 81       Wt Readings from Last 3 Encounters:   08/22/24 64.7 kg (142 lb 11.2 oz)   08/01/24 65.3 kg (143 lb 14.4 oz)   07/11/24 68.7 kg (151 lb 6.4 oz)           SCALES        12/26/2024     2:32 PM 1/27/2025     2:34 PM 2/2/2025    10:43 PM   PHQ   PHQ-9 Total Score 7  9  12    Q9: Thoughts of better off dead/self-harm past 2 weeks Not at all Not at all Not at all       Patient-reported            4/12/2024    10:38 AM 12/26/2024     2:32 PM 1/27/2025     2:51 PM   ZOË-7 SCORE   Total Score  14 (moderate anxiety) 15 (severe anxiety)   Total Score 0 14  15         Patient-reported           SUBSTANCE USE      Prior use:  -Denies any history of alcohol, recreational, or illicit substance use resulting in: legal issues, c/d programming     See 1/25/2025 Emergency Department encounter for further details:  -substance use: alcohol + THC probably contributing to delusions + erratic behaviors        Current use:     Alcohol:   -1 or 2 drinks per sitting in social setting/for dinner     Recreational Drugs:   -THC 3 to 4x/week     Medical Marijuana:  -None reported     Cigarettes per day:   -Prior smoker until 2020     Chewing tobacco:   -None reported      Vaping:    -Nicotine    Caffeine intake daily:    -1 coffee  -2 sodas        MENTAL STATUS EXAMINATION      Appearance: Adequately Groomed, Attire Appropriate for the Season  General Behavior:  Cooperative, Direct Eye Contact  Speech: Fluent, Normal rate, Soft  Musculoskeletal:    -Gait not observed during t.h. visit  -No facial tics/tremors observed   -Motor coordination is grossly intact   Mood: Anxious  Affect: Appropriate to the Content of Speech and Circumstances  Attention: Intact  Orientation:  Person, Place, Time, Situation  Thought Associations:  Intact  Thought Content: Reality based   Thought Processes: Organized, Normal rate  Memory: No overt impairment; no screenings or formal testing performed  Language: Intact  Judgement: Fair  Insight: Fair         INTERIM HX:    -Leaving for [place] this week: going to stay at [place]    -There's not a whole lot of new things going on with me  -Attending appointments  -However wasn't able to set up therapy            PSYCHIATRIC ROS    Sleep:  -Sometimes it's hard to stop my brain before bed  -Trying to let myself know it's okay if plans fall through  -Still tending to average 5 to 7 hours per night      Mood/Anxiety:  -See PHQ-9 score    -See ZOË-7 score    -Having irritability, some frustrations with Parrish, get easily triggered   -I don't like how  I get reactive or annoyed  when interacting with Francois, he's just a baby      Suicidal ideations:  -Denies passive or active thoughts at this time      SIB:  -Denies engaging in self harm       Side effects:  -Some minor dizziness about 45 minutes after taking the Buspar but it tends to go away  -Denies blurred vision, falls, or any other red flag symptoms  -It's hard to take the morning dosage with food, I usually take with just coffee bk I don't eat for awhile after waking up           VISIT INFORMATION    Date:  2025       Number:  -2nd    Patient Identifying Information:  Legal name: Neisha Montano  Preferred name: Neisha  : 2000    Participants:   -Patient  -Provider      Telehealth visit details:  Video     Start: 9:16 am  End:  9:45 am     Originating Location (pt. Location): Home  Distant Location (provider location):  On-site    Platform used for Video Visit: Gianna    Total time:   37  minutes per:    -Review of EMR including but not limited to: tabs within Chart Review + outside records if applicable   -Appointment time  -Documentation        Pat BAEZ-CNP, [facility]-BC        ----------------------------------------------------------------------------------------------------------------------        TREATMENT RISK STATEMENT    The risks, benefits, alternatives, and potential adverse effects have been explained and are understood by the patient.  The patient agrees to the treatment plan with their ability to do so.      The patient knows to call the clinic: [phone]  for any problems or concerns until the next psychiatry visit, regardless if it is within or outside of the [facility] system.     If unable to reach clinic staff (via phone call or medical messaging) during the normal business hours: 8:00 am to 4:30 pm then it is recommended accessing the nearest: emergency department, urgent care facility, or utilizing local (varies based on county of residence) and national crisis #'s or text messaging  services for immediate assistance.          ----------------------------------------------------------------------------------------------------------------------      If applicable the following has been discussed with the patient, parent/guardian, and or attending family member during the appointment:      Risks of polypharmacy and possible drug interactions with current medication list + common OTC products, herbs, and supplements.  Moving forward, it is suggested to intermittently check-in with a clinic or retail pharmacist whenever new medications or OTC/h/s are consumed.    Risks of polypharmacy and possible drug + drug interactions with current medication list.  Moving forward, it is suggested to intermittently check-in with a clinic or retail pharmacist whenever new prescription medications are added to your treatment regimen.    Recommendation to adhere to CDC guidelines as it relates to alcohol consumption.  If taking benzodiazepines, you should abstain from alcohol intake due to increased risks of CNS and respiratory depression, as well as psychomotor impairment.    If possible, it is recommended to avoid concurrent use of prescribed: opioids + benzodiazepines due to increased risks of CNS and respiratory depression, as well as the increased risk of overdose.     Recommendation to minimize and or abstain from THC use (unless you are prescribed medical marijuana).    Recommendation to abstain from illicit substances including but not limited to the following: heroin, street fentanyl, cocaine, methamphetamines, bath salts, and other synthetic products.    Recommendation to abstain from tobacco/smoking/nicotine, alcohol, THC, and all illicit substances if trying to become or are pregnant.    Do not take opioids, stimulants, and or other prescription medications unless they are specifically prescribed for you.     Black Box Warnings associated with the prescribed psychotropic(s).     Potential adverse effects  of antipsychotics including but not limited to the following: weight gain, metabolic syndrome, EPS/Tardive Dyskinesias, and Neuroleptic Malignant Syndrome.    Potential adverse effects of stimulants including but not limited to the following: sudden death, MI, stroke, HTN, cardiomyopathy (long term use), seizures, tiny, psychosis, and aggressive behaviors.

## 2025-03-11 ENCOUNTER — VIRTUAL VISIT (OUTPATIENT)
Dept: BEHAVIORAL HEALTH | Facility: CLINIC | Age: 25
End: 2025-03-11
Payer: COMMERCIAL

## 2025-03-11 DIAGNOSIS — F41.8 POSTPARTUM ANXIETY: ICD-10-CM

## 2025-03-11 DIAGNOSIS — F17.200 NICOTINE USE DISORDER: ICD-10-CM

## 2025-03-11 DIAGNOSIS — F41.1 GAD (GENERALIZED ANXIETY DISORDER): Primary | ICD-10-CM

## 2025-03-11 DIAGNOSIS — F12.90 CONTINUOUS CANNABIS USE: ICD-10-CM

## 2025-03-11 RX ORDER — BUSPIRONE HYDROCHLORIDE 10 MG/1
10 TABLET ORAL 2 TIMES DAILY
Qty: 28 TABLET | Refills: 0 | Status: SHIPPED | OUTPATIENT
Start: 2025-03-11

## 2025-03-11 RX ORDER — BUSPIRONE HYDROCHLORIDE 15 MG/1
15 TABLET ORAL 2 TIMES DAILY
Qty: 60 TABLET | Refills: 1 | Status: SHIPPED | OUTPATIENT
Start: 2025-03-11

## 2025-03-11 NOTE — TELEPHONE ENCOUNTER
Date of Last Office Visit: 3/11/25  Date of Next Office Visit:  NONE - Treatment plan not available for review. Provider to review if Return To Clinic recommendations   No shows since last visit: No  More than one patient-initiated cancellation (with reschedule) since last seen in clinic? No    []Medication refilled per  Medication Refill in Ambulatory Care  policy.  []Scope of Practice: refill request processed by LPN/MA  [x]Medication unable to be refilled by RN due to criteria not met as indicated below:    []Eligibility: has not had a provider visit within last 6 months   []Supervision: no future appointment; < 7 days before next appointment   []Compliance: no shows; cancellations; lapse in therapy   []Verification: order discrepancy; may need modification...   [] > 30-day supply request   []Advanced refill request: > 7 days before refill date   []Controlled medication   []Medication not included in policy   []Review: new med; med adjusted <= 30 days; safety alert; requires lab monitoring...   [x]Other:Treatment plan from 3/11/25 not available for RN view. RN routing to provider for review.      Medication(s) requested:   Pending Prescriptions:                       Disp   Refills    busPIRone (BUSPAR) 10 MG tablet           60 tab*1            Sig: Take 1 tablet (10 mg) by mouth 2 times daily.    Any Controlled Substance(s)? No      Requested medication(s) verified as identical to current order? Yes    Any lapse in adherence to medication(s) greater than 5 days? No      Additional action taken? Routed to provider for review.      Last visit treatment plan:     3/11/25 - patient has appointment today. RN unable to view treatment plan.    Any medication(s) require lab monitoring? No a    Brock Barron RN on 3/11/2025 at 9:36 AM

## 2025-03-11 NOTE — TELEPHONE ENCOUNTER
1.  Treatment plan on 3/11/2025 is complete (however progress note is not signed, since the appointment ended at 9:45 am)    2.

## 2025-03-11 NOTE — PATIENT INSTRUCTIONS
Buspirone/Buspar:  -Continue: 10 mg twice daily until back from [place] (approx March 23rd)  -Increase:  15 mg twice daily thereafter    ---------------------      -If there are questions/inquiries between now and the upcoming follow up appointment OR prior to transferring to the next level of care, please call or message the T.C. Psychiatry Department.    Clinic hours/phone number:   -Monday to Friday from 8:00 am to 4:30 pm  -[phone]      MyChart:  -IS NOT an emergency messaging service  -Should not be considered equivalent to text messages   -Please allow up to 3 business days for a reply   -If you do not receive a response within 3 business days, please do not hesitate to contact us again via calling (see above) or messaging to check on the status of your questions or concerns    -----------------------    Call:  -[phone] ([phone]) if guidance is needed after [facility] clinic hours about utilizing [facility] versus the Emergency Departments    ----------------------    Any time (during or after regular clinic hours) immediate and or life threatening symptoms occur (either medical or mental health), call:  [phone] and or go to the nearest Emergency Department      ---------------------    Please use the following websites to obtain a comprehensive list of all counties within the state [facility] for adult and child mental health crisis numbers:    https://[place].gov/dhs/people-we-serve/people-with-disabilities/health-care/adult-mental-health/resources/crisis-contacts.jsp    https://[place]/dhs/people-we-serve/people-with-disabilities/health-care/childrens-mental-health/resources/crisis-contacts.jsp      -------------------      Below is a list of county (also found on the above websites), state, and national crisis numbers.   These resources can provide real-time assistance if experiencing moderate to severe mental health symptoms.        Additionally, please visit your county website to find the  most up-to-date list of local:  adult, child, family, and chemical dependency services available.        [facility]  [phone]    [facility]  [phone]    [facility]  [phone]    [facility]  [phone]    [facility]  [phone]: Adults 18 and over    [phone]: Children 17 and under    [facility] Acute Psychiatric Services (APS) Assessment & Referral:   [phone]    Community Outreach for Psychiatric Emergencies (COPE):  [phone]    [facility]  [phone]: Adults 18 and over    [phone]: Children 17 and under      Walk-in crisis services are available Monday to Friday from 8 am to 5:30 pm at Urgent Care for  Adult metal health:    [address] [place] [address]  Closed on Saturday, Sunday, and holidays    [place]  [phone]: Toll free    [facility]  [phone]      [facility] [place]  [phone]: Toll free    Text: [place] to 804583      [facility] and human services  Call 211 or visit [email or web address] to obtain free and confidential h/hs information     [facility]  If you are an adult needing support, you can talk to a specialist who has first hand experience living with a mental health condition:    [phone]    Text: Support to 44121    [facility] [place]:  domestic violence/LGBTQ+  [phone] option 3    [employer]: LGBTQ+  1-745-588-7032    Text: START to 684519     Resources  9-620-BrshFbf: (1-636.582.2278)    Crisis line: 1-573.947.4582    Text: 582132    [employer]: women and sexually exploited youth  [phone]    [employer] Domestic Violence Shelter Hotline  921.158.9110    National Suicide Prevention Lifeline  984    National Youth Crisis Hotline  240

## 2025-03-12 ASSESSMENT — PATIENT HEALTH QUESTIONNAIRE - PHQ9
SUM OF ALL RESPONSES TO PHQ QUESTIONS 1-9: 8
10. IF YOU CHECKED OFF ANY PROBLEMS, HOW DIFFICULT HAVE THESE PROBLEMS MADE IT FOR YOU TO DO YOUR WORK, TAKE CARE OF THINGS AT HOME, OR GET ALONG WITH OTHER PEOPLE: SOMEWHAT DIFFICULT
SUM OF ALL RESPONSES TO PHQ QUESTIONS 1-9: 8

## 2025-03-13 ENCOUNTER — VIRTUAL VISIT (OUTPATIENT)
Dept: PSYCHOLOGY | Facility: CLINIC | Age: 25
End: 2025-03-13
Attending: NURSE PRACTITIONER
Payer: COMMERCIAL

## 2025-03-13 DIAGNOSIS — F41.1 GAD (GENERALIZED ANXIETY DISORDER): ICD-10-CM

## 2025-03-13 ASSESSMENT — COLUMBIA-SUICIDE SEVERITY RATING SCALE - C-SSRS
6. HAVE YOU EVER DONE ANYTHING, STARTED TO DO ANYTHING, OR PREPARED TO DO ANYTHING TO END YOUR LIFE?: NO
1. HAVE YOU WISHED YOU WERE DEAD OR WISHED YOU COULD GO TO SLEEP AND NOT WAKE UP?: NO
ATTEMPT LIFETIME: NO
TOTAL  NUMBER OF INTERRUPTED ATTEMPTS LIFETIME: NO
TOTAL  NUMBER OF ABORTED OR SELF INTERRUPTED ATTEMPTS LIFETIME: NO
2. HAVE YOU ACTUALLY HAD ANY THOUGHTS OF KILLING YOURSELF?: NO

## 2025-03-13 NOTE — PROGRESS NOTES
"[facility]       PATIENT'S NAME: Neisha Montano  PREFERRED NAME: Neisha  PRONOUNS:   female    MRN: [id number]  : 2000  ADDRESS: [address] [place] [address] [place] [address]  [facility]  [id number]  DATE OF SERVICE: 3/13/25  START TIME: 1:00pm  END TIME: 1:54  PREFERRED PHONE: [phone]  May we leave a program related message: Yes  EMERGENCY CONTACT: was obtained, Jewell (mom), see [facility] .  SERVICE MODALITY:  Video Visit:      Provider verified identity through the following two step process.  Patient provided:  Patient  and Patient address    Telemedicine Visit: The patient's condition can be safely assessed and treated via synchronous audio and visual telemedicine encounter.      Reason for Telemedicine Visit: Services only offered telehealth    Originating Site (Patient Location): Patient's home    Distant Site (Provider Location): Provider Remote Setting- Home Office    Consent:  The patient/guardian has verbally consented to: the potential risks and benefits of telemedicine (video visit) versus in person care; bill my insurance or make self-payment for services provided; and responsibility for payment of non-covered services.     Patient would like the video invitation sent by:  My Chart    Mode of Communication:  [facility]    Distant Location (Provider):  Off-site    As the provider I attest to compliance with applicable laws and regulations related to telemedicine.    UNIVERSAL ADULT Mental Health DIAGNOSTIC ASSESSMENT    Identifying Information:  Patient is a 25 year old,   individual.  Patient was referred for an assessment by  psychiatrist/hospital.  Patient attended the session alone.    Chief Complaint:   The reason for seeking services at this time is: \"Depression, anxiety and possibly a little ptsd\". Pt endorsed ongoing depressive symptoms, including low energy/motivation, poor sleep, and racing thoughts, 8 months postpartum. Pt noted that " some of this seemed to even have started in the end of her third trimester. Pt does think that for the most part her depressive symptoms have gotten better and yet the low energy seems to be persisting because of unrestful sleep.     Pt described feeling overwhelmed by small things and this will turn into anger/rage very briefly. Pt has not acted out on these feelings but worries about how minor irritation can turn into anger in a way it never did before.   Pt does endorse being a very toxic relationship previously, and left that about three years ago, but is now seeing some of her old unhelpful patterns from that relationship coming into the one she has now. Pt clarified that her current relationship is healthy and she doesn't like to see these patterns starting to re-emerge.   Pt has started medication to help with her anxiety, and she feels are helping. Pt denies a hx of serious MH and yet suspects that she had some depression in late adolescence.   Pt does endorses that sleep has always been difficult since she was young and she finds that she will sometimes have intrusive thoughts that can make it hard to stay asleep or fall asleep.   Described almost post-partum-OCD symptoms with intrusive images/movies around her son getting harmed.   Pt described herself as being naturally nervous and more prone to over thinking which can feed into this.       The problem(s) began May 2024.    Patient has attempted to resolve these concerns in the past through medication, coping skills, exercise, and hobbies .    Social/Family History:  Patient reported they grew up in [facility].  They were raised by biological mother; stepfather, two siblings she grew up, and several step/half siblings. Bio-dad was not involved most of her childhood, due to his own issues but he re-entered her life when she was 17. Parents  / .  Patient reported that their childhood was berta, good, and supportive.  Patient  described their current relationships with family of origin as reconnecting with bio-dad, and great relationship with her current step-dad and mom.     The patient describes their cultural background as .  Cultural influences and impact on patient's life structure, values, norms, and healthcare: N/A.  Contextual influences on patient's health include: Economic Factors pt recognizes that they will need to move in a couple years, which is something she can find herself spiraling on .    These factors will be addressed in the Preliminary Treatment plan. Patient identified their preferred language to be English. Patient reported they does not need the assistance of an  or other support involved in therapy.     Patient reported had no significant delays in developmental tasks.   Patient's highest education level was some college  .  Patient identified the following learning problems: reading.  Modifications will not be used to assist communication in therapy. Patient reports they are  able to understand written materials.    Patient reported the following relationship history of 2 serious relationships, with the second one being her current one.  Patient's current relationship status is has a partner or significant other for two.   Patient identified their sexual orientation as heterosexual.  Patient reported having 1 child(rocío). Patient identified partner; mother; siblings; friends as part of their support system.  Patient identified the quality of these relationships as good.      Patient's current living/housing situation involves staying in own home/apartment.  The immediate members of family and household include Brandi Cabrera, Carlos,Significant other and their son, aurelio, and they report that housing is stable.    Patient is currently unemployed.  Patient reports their finances are obtained through partner. Patient does identify finances as a current stressor.      Patient reported that they have  not been involved with the legal system. Patient does not report being under probation/ parole/ jurisdiction. They are not under any current court jurisdiction. .    Patient's Strengths and Limitations:  Patient identified the following strengths or resources that will help them succeed in treatment: {OP BEH SUCCEED:750319}. Things that may interfere with the patient's success in treatment include: {OP BEH INTERFERE:572134}.     Assessments:  The following assessments were completed by patient for this visit:  PHQ2:   Phq2 (   1999 [employer],All Rights Reserved. Used With Permission. Developed By Morgan Jean,Renay Vásquez,Timbo Uribe And Colleagues,With An Educational Breanna From [employer].)    3/10/2025  8:57 PM CDT - Filed by Patient   The following questionnaire should only be answered by the patient. Are you the patient? Yes   Over the last 2 weeks, how often have you been bothered by any of the following problems?    Q1: Little interest or pleasure in doing things Several days   Q2: Feeling down, depressed or hopeless Several days   PHQ2 (   1999 [employer],ALL RIGHTS RESERVED. USED WITH PERMISSION. DEVELOPED BY MORGAN JEAN,RENAY VÁSQUEZ,TIMBO URIBE AND COLLEAGUES,WITH AN EDUCATIONAL BREANNA FROM [employer].)    PHQ-2 Score (range: 0 - 6) 2       PHQ9:       9/9/2014     8:30 AM 4/12/2024    10:38 AM 12/26/2024     2:32 PM 1/27/2025     2:34 PM 2/2/2025    10:43 PM 3/12/2025     5:36 PM   PHQ-9 SCORE   PHQ-9 Total Score 0        PHQ-9 Total Score MyChart   7 (Mild depression) 9 (Mild depression) 12 (Moderate depression) 8 (Mild depression)   PHQ-9 Total Score  2 7  9  12  8        Patient-reported     GAD2:       1/27/2025     2:51 PM 1/31/2025    12:29 PM 3/10/2025     8:57 PM 3/12/2025     5:37 PM   ZOË-2   Feeling nervous, anxious, or on edge 3 3 1 1   Not being able to stop or control worrying 3 3 0 0   ZOË-2 Total Score 6  6  1  1        Patient-reported     GAD7:        4/12/2024    10:38 AM 12/26/2024     2:32 PM 1/27/2025     2:51 PM   ZOË-7 SCORE   Total Score  14 (moderate anxiety) 15 (severe anxiety)   Total Score 0 14  15        Patient-reported     CAGE-AID:       1/27/2025     2:54 PM   CAGE-AID Total Score   Total Score 1    Total Score MyChart 1 (A total score of 2 or greater is considered clinically significant)       Patient-reported     PROMIS 10-Global Health (only subscores and total score):       1/27/2025     2:53 PM 1/31/2025    12:30 PM 3/12/2025     5:38 PM   PROMIS-10 Scores Only   Global Mental Health Score 9  7  9    Global Physical Health Score 15  14  11    PROMIS TOTAL - SUBSCORES 24  21  20        Patient-reported     [place] Suicide Severity Rating Scale (Lifetime/Recent)      7/8/2024     6:08 PM 7/10/2024     3:05 AM 1/25/2025     5:27 AM 1/25/2025     8:48 AM 3/13/2025     1:47 PM   [place] Suicide Severity Rating (Lifetime/Recent)   Q1 Wish to be Dead (Lifetime)    Yes    Q2 Non-Specific Active Suicidal Thoughts (Lifetime)    No    Q1 Wished to be Dead (Past Month) 0-->no 0-->no 1-->yes 1-->yes    Q2 Suicidal Thoughts (Past Month) 0-->no 0-->no 1-->yes 0-->no    Q3 Suicidal Thought Method   0-->no 0-->no    Q4 Suicidal Intent without Specific Plan   0-->no 0-->no    Q5 Suicide Intent with Specific Plan   0-->no 0-->no    Q6 Suicide Behavior (Lifetime) 0-->no 0-->no 1-->yes 0-->no    If yes to Q6, within past 3 months?   0-->no 0-->no    Level of Risk per Screen no risks indicated no risks indicated moderate risk moderate risk    1. Wish to be Dead (Lifetime)     N   2. Non-Specific Active Suicidal Thoughts (Lifetime)     N   3. Active Suicidal Ideation with any Methods (Not Plan) Without Intent to Act (Lifetime)    N    4. Active Suicidal Ideation with Some Intent to Act, Without Specific Plan (Lifetime)    N    5. Active Suicidal Ideation with Specific Plan and Intent (Lifetime)    N    Most Severe Ideation Rating (Lifetime)    1    Most Severe  Ideation Rating (Past 1 Month)    1    Frequency (Lifetime)    1    Frequency (Past 1 Month)    1    Duration (Lifetime)    1    Duration (Past 1 Month)    1    Controllability (Lifetime)    1    Controllability (Past 1 Month)    1    Deterrents (Lifetime)    1    Deterrents (Past 1 Month)    1    Reasons for Ideation (Lifetime)    2    Reasons for Ideation (Past 1 Month)    2    Actual Attempt (Lifetime)    N N   Actual Attempt (Past 3 Months)    N    Has subject engaged in non-suicidal self-injurious behavior? (Lifetime)    Y Y   Has subject engaged in non-suicidal self-injurious behavior? (Past 3 Months)    N N   Interrupted Attempts (Lifetime)    N N   Interrupted Attempts (Past 3 Months)    N    Aborted or Self-Interrupted Attempt (Lifetime)    N N   Aborted or Self-Interrupted Attempt (Past 3 Months)    N    Preparatory Acts or Behavior (Lifetime)    N N   Preparatory Acts or Behavior (Past 3 Months)    N    Calculated C-SSRS Risk Score (Lifetime/Recent)    No Risk Indicated No Risk Indicated       Personal and Family Medical History:  {OP BEH ADULT MEDICAL:987877}    Substance Use:   {OP BEH ADULT SUBSTANCE USE:717607}    Significant Losses / Trauma / Abuse / Neglect Issues:   Patient did not {did/did not:293931} serve in the .  There {ARE/ARE NOT:201110} indications or report of significant loss, trauma, abuse or neglect issues related to: {Types of Loss:032320}.  Patient {HAS HAS NOT:531040} been a victim of exploitation.  Concerns for possible neglect {Neglect:816625}. ***    Safety Assessment:   Patient {OP BEH HOMICIDAL IDEATION:533520}  Patient {OP BEH SI FOR DA:711595}  Patient {OP BEH SIB FOR DA:675744}  Patient {OP BEH YAJAIRA RISK BEHAVIORS:084619} associated with substance use.  Patient {OP BEH MH RISK BEHAVIORS:628775} associated with mental health symptoms.  Patient {OP BEH PERSONAL SAFETY CONCERNS:294018}  Patient {OP BEH ASSAULT FOR DA:233894}  Patient {OP BEH SEXUAL OFFENSE HISTORY:396793}    Patient reports there {ARE/ARE NOT:214372}   firearms in the house.    Patient reports the following protective factors: {protectivefactors:503952} forward or future oriented thinking; dedication to family or friends; safe and stable environment; effectively controls impulses; commitment to well being; sense of personal control or determination    Risk Plan:  See Recommendations for Safety and Risk Management Plan    Review of Symptoms per patient report:   {OP BEH ADULT ROS:633212}    {OP BEH DA SUMMARY:994167}    Provider Name/ Credentials:  ***  March 13, 2025

## 2025-03-14 NOTE — PROGRESS NOTES
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 be initiated with the following  support professionals:  NA .      6.  Referrals:   The following referral(s) will be initiated:  NA .       A Release of Information has been obtained for the following:  NA .     Clinical Substantiation/medical necessity for the above recommendations:  NA.    7. YAJAIRA:    YAJAIRA:  Discussed the general effects of drugs and alcohol on health and well-being. Provider gave patient printed information about the  effects of chemical use on their health and well being. Recommendations:  NA .     8. Records:   These were reviewed at time of assessment.   Information in this assessment was obtained from the medical record and  provided by patient who is a good historian.    Patient will have open access to their mental health medical record.    9.   Interactive Complexity: No    10. Safety Plan: No Safety plan indicated    Provider Name/ Credentials:  CHERRI Benson  March 13, 2025

## 2025-03-24 ENCOUNTER — TELEPHONE (OUTPATIENT)
Dept: BEHAVIORAL HEALTH | Facility: CLINIC | Age: 25
End: 2025-03-24
Payer: COMMERCIAL

## 2025-03-24 NOTE — TELEPHONE ENCOUNTER
----- Message from Pat Roberts sent at 3/11/2025 10:48 AM CDT -----  Transition Clinic Referral   [place]       Please Check Type of Referral Requested:       _x___THERAPY: The Transition clinic is able to schedule patients.      -Referral also place for long term individual therapy within [facility]    -Please call the patient after 3/23/2025 to schedule T.C. individual therapy, as the patient will be out of state until this time.    -Thanks for your help and teamwork.    Pat Roberts, APRN CNP    (Master Form: Updated 11/28/2023)

## 2025-03-24 NOTE — TELEPHONE ENCOUNTER
First attempt to contact pt. Avivar left a VM with TC contact info and encouraged a phone call back to schedule TC appointment. Avivar will postpone for tomorrow.    Roseline Cooley  03/24/2025  [phone]

## 2025-03-25 NOTE — TELEPHONE ENCOUNTER
Second attempt at reaching patient. Left message asking for a return call to schedule with the TC. Referral will be closed.    Joanna Ventura  Transition Clinic Coordinator  03/25/25 10:50 AM

## 2025-03-27 ENCOUNTER — VIRTUAL VISIT (OUTPATIENT)
Dept: PSYCHOLOGY | Facility: CLINIC | Age: 25
End: 2025-03-27
Payer: COMMERCIAL

## 2025-03-27 DIAGNOSIS — F41.1 GAD (GENERALIZED ANXIETY DISORDER): Primary | ICD-10-CM

## 2025-03-27 PROCEDURE — 90791 PSYCH DIAGNOSTIC EVALUATION: CPT | Mod: 95 | Performed by: COUNSELOR

## 2025-04-04 ENCOUNTER — VIRTUAL VISIT (OUTPATIENT)
Dept: PSYCHOLOGY | Facility: CLINIC | Age: 25
End: 2025-04-04
Payer: COMMERCIAL

## 2025-04-04 DIAGNOSIS — F41.1 GAD (GENERALIZED ANXIETY DISORDER): Primary | ICD-10-CM

## 2025-04-04 DIAGNOSIS — F43.21 ADJUSTMENT DISORDER WITH DEPRESSED MOOD: ICD-10-CM

## 2025-04-04 PROCEDURE — 90834 PSYTX W PT 45 MINUTES: CPT | Mod: 95 | Performed by: COUNSELOR

## 2025-04-04 NOTE — PROGRESS NOTES
[facility]                                     Progress Note    Patient Name: Neisha Montano  Date: 4/04/25         Service Type: Individual      Session Start Time: 2:00  Session End Time: 2:44     Session Length: 44 minutes    Session #: 2    Attendees: Client attended alone    Service Modality:  Video Visit:      Provider verified identity through the following two step process.  Patient provided:  Patient is known previously to provider    Telemedicine Visit: The patient's condition can be safely assessed and treated via synchronous audio and visual telemedicine encounter.      Reason for Telemedicine Visit: Services only offered telehealth    Originating Site (Patient Location): Patient's home    Distant Site (Provider Location): Provider Remote Setting- Home Office    Consent:  The patient/guardian has verbally consented to: the potential risks and benefits of telemedicine (video visit) versus in person care; bill my insurance or make self-payment for services provided; and responsibility for payment of non-covered services.     Patient would like the video invitation sent by:  My Chart    Mode of Communication:  Video Conference via [employer]    Distant Location (Provider):  Off-site    As the provider I attest to compliance with applicable laws and regulations related to telemedicine.    DATA  Interactive Complexity: No  Crisis: No        Progress Since Last Session (Related to Symptoms / Goals / Homework):   Symptoms: No change about the same    Homework:  NA, establishing trx plan      Episode of Care Goals: Achieved / completed to satisfaction - PREPARATION (Decided to change - considering how); Intervened by negotiating a change plan and determining options / strategies for behavior change, identifying triggers, exploring social supports, and working towards setting a date to begin behavior change     Current / Ongoing Stressors and Concerns:   Pt is currently seeking therapy due to  "ongoing stressors related to being a new parent and struggling with getting overwhelmed by tasks. Pt is about 9 months post-partum and is looking to return to work in the near future. Pt noted that she is able to meet her parenting responsibilities as a new mom, but getting stuff done around the house is really hard, finding herself procrastinating and avoiding much of the time. Pt thinks it is less of an energy issues and more of a motivational one, and will find herself getting irritable at times as well when she can't get herself past this barrier, as this wasn't always the case. Pt mentioned that this irritability with sometimes spill over into her relationship as well, as she will \"blow up\" at times when it become to much. Pt identified that part of the problem is she also struggles with being able to ask for help when she needs it and being able to constructively express how she is feeling when her emotions get big. Pt connected this back to her previous relationship where she learned these unhelpful communication dynamics and agreed that one way of handling this was putting her energy in cleaning. Now that she is in a better relationship and that stressor isn't present she doesn't have that same external pressure to clean and yet her unhelpful communication habits still come up. Strategies to work on task completion were discussed as pt thinks that is the root of her irritability and if she could do that, her irritability would be more manageable so she doesn't get sucked into her old unhelpful communication patterns.      Treatment Objective(s) Addressed in This Session:   identify and use 3 strategies to improve organization  participate in 3 activities to improve mood  identify patterns of escalation  (i.e. tightness in chest, flushed face, increased heart rate, clenched hands, etc.)  identify at least 3 techniques for intervening on the escalation  use cognitive strategies identified in therapy to " challenge anxious thoughts  Increase interest, engagement, and pleasure in doing things  Decrease frequency and intensity of feeling down, depressed, hopeless  Feel less tired and more energy during the day   Identify negative self-talk and behaviors: challenge core beliefs, myths, and actions  Improve concentration, focus, and mindfulness in daily activities   Feel less fidgety, restless or slow in daily activities / interpersonal interactions  identify 3 strategies for improving mood  practice one mindfulness skill each day for 5-10 minutes  identify two areas of life that you would like to have improved functioning  identify at least 3 self-care activities  Co-develop trx plan     Intervention:   CBT: Reviewed how her thinking has impacted   Motivational Interviewing    MI Intervention: Co-Developed Goal: trx plan, Expressed Empathy/Understanding, Supported Autonomy, Collaboration, Evocation, Open-ended questions, Reflections: simple and complex, Change talk (evoked), and Reframe     Change Talk Expressed by the Patient: Desire to change Ability to change Reasons to change Need to change Committment to change    Provider Response to Change Talk: E - Evoked more info from patient about behavior change, A - Affirmed patient's thoughts, decisions, or attempts at behavior change, R - Reflected patient's change talk, and S - Summarized patient's change talk statements    Psychodynamic: Processed through internal-experiences of being a new parents and past relationship challenges  Solution Focused: Identified immediate areas of concern and potential barrier to success    Assessments completed prior to visit:  PHQ2:   Phq2 (   1999 [employer],All Rights Reserved. Used With Permission. Developed By Sarah Jean,Renay Rosales,Timbo Gonzales And Colleagues,With An Educational Bunny From [employer].)    3/10/2025  8:57 PM CDT - Filed by Patient   The following questionnaire should only be answered by the  patient. Are you the patient? Yes   Over the last 2 weeks, how often have you been bothered by any of the following problems?    Q1: Little interest or pleasure in doing things Several days   Q2: Feeling down, depressed or hopeless Several days   PHQ2 (   1999 [employer],ALL RIGHTS RESERVED. USED WITH PERMISSION. DEVELOPED BY MORGAN CHUNG,MGEHA VÁSQUEZ,SHANNAN URIBE AND COLLEAGUES,WITH AN EDUCATIONAL BREANNA FROM [employer].)    PHQ-2 Score (range: 0 - 6) 2       PHQ9:       9/9/2014     8:30 AM 4/12/2024    10:38 AM 12/26/2024     2:32 PM 1/27/2025     2:34 PM 2/2/2025    10:43 PM 3/12/2025     5:36 PM   PHQ-9 SCORE   PHQ-9 Total Score 0        PHQ-9 Total Score MyChart   7 (Mild depression) 9 (Mild depression) 12 (Moderate depression) 8 (Mild depression)   PHQ-9 Total Score  2 7  9  12  8        Patient-reported     GAD2:       1/27/2025     2:51 PM 1/31/2025    12:29 PM 3/10/2025     8:57 PM 3/12/2025     5:37 PM   ZOË-2   Feeling nervous, anxious, or on edge 3 3 1 1   Not being able to stop or control worrying 3 3 0 0   ZOË-2 Total Score 6  6  1  1        Patient-reported     GAD7:       4/12/2024    10:38 AM 12/26/2024     2:32 PM 1/27/2025     2:51 PM   ZOË-7 SCORE   Total Score  14 (moderate anxiety) 15 (severe anxiety)   Total Score 0 14  15        Patient-reported     CAGE-AID:       1/27/2025     2:54 PM   CAGE-AID Total Score   Total Score 1    Total Score MyChart 1 (A total score of 2 or greater is considered clinically significant)       Patient-reported     [place] Suicide Severity Rating Scale (Lifetime/Recent)      7/8/2024     6:08 PM 7/10/2024     3:05 AM 1/25/2025     5:27 AM 1/25/2025     8:48 AM 3/13/2025     1:47 PM   [place] Suicide Severity Rating (Lifetime/Recent)   Q1 Wish to be Dead (Lifetime)    Yes    Q2 Non-Specific Active Suicidal Thoughts (Lifetime)    No    Q1 Wished to be Dead (Past Month) 0-->no 0-->no 1-->yes 1-->yes    Q2 Suicidal Thoughts (Past Month) 0-->no  0-->no 1-->yes 0-->no    Q3 Suicidal Thought Method   0-->no 0-->no    Q4 Suicidal Intent without Specific Plan   0-->no 0-->no    Q5 Suicide Intent with Specific Plan   0-->no 0-->no    Q6 Suicide Behavior (Lifetime) 0-->no 0-->no 1-->yes 0-->no    If yes to Q6, within past 3 months?   0-->no 0-->no    Level of Risk per Screen no risks indicated no risks indicated moderate risk moderate risk    1. Wish to be Dead (Lifetime)     N   2. Non-Specific Active Suicidal Thoughts (Lifetime)     N   3. Active Suicidal Ideation with any Methods (Not Plan) Without Intent to Act (Lifetime)    N    4. Active Suicidal Ideation with Some Intent to Act, Without Specific Plan (Lifetime)    N    5. Active Suicidal Ideation with Specific Plan and Intent (Lifetime)    N    Most Severe Ideation Rating (Lifetime)    1    Most Severe Ideation Rating (Past 1 Month)    1    Frequency (Lifetime)    1    Frequency (Past 1 Month)    1    Duration (Lifetime)    1    Duration (Past 1 Month)    1    Controllability (Lifetime)    1    Controllability (Past 1 Month)    1    Deterrents (Lifetime)    1    Deterrents (Past 1 Month)    1    Reasons for Ideation (Lifetime)    2    Reasons for Ideation (Past 1 Month)    2    Actual Attempt (Lifetime)    N N   Actual Attempt (Past 3 Months)    N    Has subject engaged in non-suicidal self-injurious behavior? (Lifetime)    Y Y   Has subject engaged in non-suicidal self-injurious behavior? (Past 3 Months)    N N   Interrupted Attempts (Lifetime)    N N   Interrupted Attempts (Past 3 Months)    N    Aborted or Self-Interrupted Attempt (Lifetime)    N N   Aborted or Self-Interrupted Attempt (Past 3 Months)    N    Preparatory Acts or Behavior (Lifetime)    N N   Preparatory Acts or Behavior (Past 3 Months)    N    Calculated C-SSRS Risk Score (Lifetime/Recent)    No Risk Indicated No Risk Indicated         ASSESSMENT: Current Emotional / Mental Status (status of significant symptoms):   Risk status (Self /  Other harm or suicidal ideation)   Patient denies current fears or concerns for personal safety.   Patient denies current or recent suicidal ideation or behaviors.   Patient denies current or recent homicidal ideation or behaviors.   Patient denies current or recent self injurious behavior or ideation.   Patient denies other safety concerns.   Patient reports there has been no change in risk factors since their last session.     Patient reports there has been no change in protective factors since their last session.     Recommended that patient call 911 or go to the local ED should there be a change in any of these risk factors     Appearance:   Appropriate    Eye Contact:   Good    Psychomotor Behavior: Normal    Attitude:   Cooperative  Interested Friendly Pleasant   Orientation:   All   Speech    Rate / Production: Normal/ Responsive    Volume:  Normal    Mood:    Anxious  Normal   Affect:    Appropriate    Thought Content:  Clear    Thought Form:  Coherent  Logical    Insight:    Fair      Medication Review:   Changes to psychiatric medications, see updated Medication List in EPIC.      Medication Compliance:   Yes     Changes in Health Issues:   None reported     Chemical Use Review:   Substance Use: Chemical use reviewed, no active concerns identified      Tobacco Use: No change in amount of tobacco use since last session.  Patient declined discussion at this time    Diagnosis:  1. ZOË (generalized anxiety disorder)    2. Adjustment disorder with depressed mood    R/O social anxiety    Collateral Reports Completed:   Not Applicable    PLAN: (Patient Tasks / Therapist Tasks / Other)  Write down two things to get done each day, check off when done  Break tasks down when feeling overwhelmed  Check-in with self about how you are feeling    CHERRI Benson                                                         ______________________________________________________________________    Individual Treatment  Plan    Patient's Name: Neisha Montano  YOB: 2000    Date of Creation: 4/4/25  Date Treatment Plan Last Reviewed/Revised: 4/4/25    DSM5 Diagnoses: 300.02 (F41.1) Generalized Anxiety Disorder or Adjustment Disorders  309.0 (F43.21) With depressed mood  Psychosocial / Contextual Factors: CIS Female, mom, recent first child, in a committed relationship, house maker, and employed part time.   PROMIS (reviewed every 90 days):   PROMIS-10 Scores        1/27/2025     2:53 PM 1/31/2025    12:30 PM 3/12/2025     5:38 PM   PROMIS-10 Total Score w/o Sub Scores   PROMIS TOTAL - SUBSCORES 24  21  20        Patient-reported       Referral / Collaboration:  Referral to another professional/service is not indicated at this time..    Anticipated number of session for this episode of care: 6-9 sessions  Anticipation frequency of session: Biweekly  Anticipated Duration of each session: 38-52 minutes  Treatment plan will be reviewed in 90 days or when goals have been changed.       MeasurableTreatment Goal(s) related to diagnosis / functional impairment(s)  Goal 1: Patient will decrease her social anxiety, including when engaging with others, and worrying less about what others might be thinking about or judging her.      I will know I've met my goal when I am able to have a conversation without feeling my heart beat and not bottling up my emotions.      Objective #A (Patient Action)   Patient will identify and compliment positives at least 3 times daily to support positive self-image  identify thoughts, triggers, and  stressors which contribute to feelings of anxiety  use cognitive strategies identified in therapy to challenge anxious thoughts  Identify negative self-talk and behaviors: challenge core beliefs, myths, and actions  Feel less fidgety, restless or slow in daily activities / interpersonal interactions.  Status: New - Date: 4/4/25      Intervention(s)  Therapist will assign homework related to target  objective with the intent to promote pt autonomy and better manage MH.  Facilitate increase in self-awareness  Provide psycho-education on avoidance-anxiety cycle and anxiety symptomology   Teach/promote utilization of critical observation and thought challenging     Objective #B Asking for help and not bottle up emotions   Patient will identify patterns of escalation  (i.e. tightness in chest, flushed face, increased heart rate, clenched hands, etc.)  identify at least 3 techniques for intervening on the escalation  practice one mindfulness skill each day for 5-10 minutes  learn & utilize at least 3 assertive communication skills weekly.  Status: New - Date: 4/4/25      Intervention(s)  Therapist will assign homework related to target objective with the intent to promote pt autonomy and better manage MH.  Facilitate increase in self-awareness  Provide psycho-education on alternative communication styles and mindfulness techniques   Teach/promote utilization of anger management tools and self-advocating skills    Goal 2: Patient will foster internal-motivation and improve her focus/concentration to complete tasks one at a time instead of becoming overwhelmed.    I will know I've met my goal when I get tasks done without being irritated or annoyed.      Objective #A (Patient Action)    Status: New - Date: 4/4/25      Patient will identify and use 3 strategies to improve organization  identify 3 strategies to more effectively address stressors  Increase interest, engagement, and pleasure in doing things  Decrease frequency and intensity of feeling down, depressed, hopeless  Feel less tired and more energy during the day   Identify negative self-talk and behaviors: challenge core beliefs, myths, and actions  Improve concentration, focus, and mindfulness in daily activities   identify two areas of life that you would like to have improved functioning.    Intervention(s)  Therapist will assign homework related to target  objective with the intent to promote pt autonomy and better manage MH.  Facilitate increase in self-awareness  Provide psycho-education on organizational strategies and barrier identification  Teach/promote utilization of habit/routine building skills and goal setting tools    Objective #B get out and do things or be more active  Patient will participate in 3 activities to improve mood  attend and participate in social or recreational activities at least once a week  identify 3 strategies for improving mood  identify at least 3 self-care activities.    Status: New - Date: 4/4/25      Intervention(s)  Therapist will assign homework related to target objective with the intent to promote pt autonomy and better manage MH.  Facilitate increase in self-awareness  Provide psycho-education on opposite action  Teach/promote utilization of behavior activation    Patient has reviewed and agreed to the above plan.      Ronaldo De La Cruz, DIMITRIC  April 4, 2025

## 2025-04-21 ENCOUNTER — TELEPHONE (OUTPATIENT)
Dept: BEHAVIORAL HEALTH | Facility: CLINIC | Age: 25
End: 2025-04-21
Payer: COMMERCIAL

## 2025-04-21 NOTE — TELEPHONE ENCOUNTER
Appointment reminder voicemail.    Joanna Ventura  Transition Clinic Coordinator  04/21/25 2:40 PM

## 2025-04-29 ENCOUNTER — VIRTUAL VISIT (OUTPATIENT)
Dept: PSYCHOLOGY | Facility: CLINIC | Age: 25
End: 2025-04-29
Payer: COMMERCIAL

## 2025-04-29 ENCOUNTER — TELEPHONE (OUTPATIENT)
Dept: BEHAVIORAL HEALTH | Facility: CLINIC | Age: 25
End: 2025-04-29
Payer: COMMERCIAL

## 2025-04-29 DIAGNOSIS — F43.21 ADJUSTMENT DISORDER WITH DEPRESSED MOOD: ICD-10-CM

## 2025-04-29 DIAGNOSIS — F41.1 GAD (GENERALIZED ANXIETY DISORDER): Primary | ICD-10-CM

## 2025-04-29 PROCEDURE — 90834 PSYTX W PT 45 MINUTES: CPT | Mod: 95 | Performed by: COUNSELOR

## 2025-04-29 NOTE — PROGRESS NOTES
[facility]                                     Progress Note    Patient Name: Neisha Montano  Date: 4/29/25         Service Type: Individual      Session Start Time: 10:02  Session End Time: 10:45     Session Length: 43 minutes    Session #: 3    Attendees: Client attended alone    Service Modality:  Video Visit:      Provider verified identity through the following two step process.  Patient provided:  Patient is known previously to provider    Telemedicine Visit: The patient's condition can be safely assessed and treated via synchronous audio and visual telemedicine encounter.      Reason for Telemedicine Visit: Services only offered telehealth    Originating Site (Patient Location): Patient's home    Distant Site (Provider Location): Provider Remote Setting- Home Office    Consent:  The patient/guardian has verbally consented to: the potential risks and benefits of telemedicine (video visit) versus in person care; bill my insurance or make self-payment for services provided; and responsibility for payment of non-covered services.     Patient would like the video invitation sent by:  My Chart    Mode of Communication:  Video Conference via [employer]    Distant Location (Provider):  Off-site    As the provider I attest to compliance with applicable laws and regulations related to telemedicine.    DATA  Interactive Complexity: No  Crisis: No        Progress Since Last Session (Related to Symptoms / Goals / Homework):   Symptoms: Improving pt is feeling less overwhelmed    Homework: Achieved / completed to satisfaction      Episode of Care Goals: Satisfactory progress - ACTION (Actively working towards change); Intervened by reinforcing change plan / affirming steps taken     Current / Ongoing Stressors and Concerns:   Pt is currently seeking therapy due to ongoing stressors related to being a new parent and struggling with getting overwhelmed by tasks. Pt is about 9 months post-partum and recently  returned to work part time. Pt noted that she is able to meet her parenting responsibilities as a new mom, but getting stuff done around the house is really hard, finding herself procrastinating and avoiding much of the time. Pt thinks it is less of an energy issues and more of a motivational one, and will find herself getting irritable at times as well when she can't get herself past this barrier, as this wasn't always the case. Since last session, pt reported that things have been going really well and that she has been feeling overall better. Pt processed through her homework of identifying two things to do each day as being really helpful. Pt reflected on how she has been applying the list approach and being kinder to herself in general when she only gets the two things done, which has also made it easier for her to be okay with her house not being perfectly clean. Pt also has been spending time with her friend, who has been helpful with more regularily cleaning and going to work out twice a week with. Pt also thinks that the improved weather/season is also helping as it gives her the chance to be more active outside.Pt still notices that her irritability is present, though is better since feeling less overwhelmed. Pt thinks that breastfeeding is one of her stressor that is contributing to her irritability that she hadn't noticed before. Pt verbalized how she has been checking-in with herself more often and using positive self-affirmations, to help her put things into perspective. Pt has found that her communication with her partner has been somewhat better as well, with pt coming to see that she sometimes does better through expressing herself through texting. Pt briefly discussed how she needs to do some medical follow-up stuff and reschedule some appointments.      Treatment Objective(s) Addressed in This Session:   identify and use 3 strategies to improve organization, participate in 3 activities to improve  mood, identify at least 3 techniques for intervening on the escalation  identify patterns of escalation  (i.e. tightness in chest, flushed face, increased heart rate, clenched hands, etc.), use cognitive strategies identified in therapy to challenge anxious thoughts, Increase interest, engagement, and pleasure in doing things  Decrease frequency and intensity of feeling down, depressed, hopeless  Feel less tired and more energy during the day   Identify negative self-talk and behaviors: challenge core beliefs, myths, and actions  Improve concentration, focus, and mindfulness in daily activities   Feel less fidgety, restless or slow in daily activities / interpersonal interactions, identify 3 strategies for improving mood, identify two areas of life that you would like to have improved functioning, practice one mindfulness skill each day for 5-10 minutes, identify at least 3 self-care activities       Intervention:   CBT: Reviewed how her thinking has impacted   Motivational Interviewing    MI Intervention: Expressed Empathy/Understanding, Supported Autonomy, Collaboration, Evocation, Open-ended questions, Reflections: simple and complex, Change talk (evoked), and Reframe     Change Talk Expressed by the Patient: Desire to change Ability to change Reasons to change Need to change Committment to change    Provider Response to Change Talk: E - Evoked more info from patient about behavior change, A - Affirmed patient's thoughts, decisions, or attempts at behavior change, R - Reflected patient's change talk, and S - Summarized patient's change talk statements    Psychodynamic: Processed through internal-experiences of being a new parents and past relationship challenges  Solution Focused: Identified immediate areas of concern and potential barrier to success    Assessments completed prior to visit:  PHQ2:   Phq2 (   1999 [employer],All Rights Reserved. Used With Permission. Developed By Renay Elizabeth  [employer],With An Educational Breanna From [employer].)    3/10/2025  8:57 PM CDT - Filed by Patient   The following questionnaire should only be answered by the patient. Are you the patient? Yes   Over the last 2 weeks, how often have you been bothered by any of the following problems?    Q1: Little interest or pleasure in doing things Several days   Q2: Feeling down, depressed or hopeless Several days   PHQ2 (   1999 [employer],ALL RIGHTS RESERVED. USED WITH PERMISSION. DEVELOPED BY MORGAN CHUNG,[employer],WITH AN EDUCATIONAL BREANNA FROM [employer].)    PHQ-2 Score (range: 0 - 6) 2       PHQ9:       9/9/2014     8:30 AM 4/12/2024    10:38 AM 12/26/2024     2:32 PM 1/27/2025     2:34 PM 2/2/2025    10:43 PM 3/12/2025     5:36 PM   PHQ-9 SCORE   PHQ-9 Total Score 0        PHQ-9 Total Score MyChart   7 (Mild depression) 9 (Mild depression) 12 (Moderate depression) 8 (Mild depression)   PHQ-9 Total Score  2 7  9  12  8        Patient-reported     GAD2:       1/27/2025     2:51 PM 1/31/2025    12:29 PM 3/10/2025     8:57 PM 3/12/2025     5:37 PM 4/29/2025     9:57 AM   ZOË-2   Feeling nervous, anxious, or on edge 3 3 1 1 0   Not being able to stop or control worrying 3 3 0 0 0   ZOË-2 Total Score 6  6  1  1  0        Patient-reported     GAD7:       4/12/2024    10:38 AM 12/26/2024     2:32 PM 1/27/2025     2:51 PM   ZOË-7 SCORE   Total Score  14 (moderate anxiety) 15 (severe anxiety)   Total Score 0 14  15        Patient-reported     CAGE-AID:       1/27/2025     2:54 PM   CAGE-AID Total Score   Total Score 1    Total Score MyChart 1 (A total score of 2 or greater is considered clinically significant)       Patient-reported     [place] Suicide Severity Rating Scale (Lifetime/Recent)      7/8/2024     6:08 PM 7/10/2024     3:05 AM 1/25/2025     5:27 AM 1/25/2025     8:48 AM 3/13/2025     1:47 PM   [place] Suicide Severity Rating (Lifetime/Recent)    Q1 Wish to be Dead (Lifetime)    Yes    Q2 Non-Specific Active Suicidal Thoughts (Lifetime)    No    Q1 Wished to be Dead (Past Month) 0-->no 0-->no 1-->yes 1-->yes    Q2 Suicidal Thoughts (Past Month) 0-->no 0-->no 1-->yes 0-->no    Q3 Suicidal Thought Method   0-->no 0-->no    Q4 Suicidal Intent without Specific Plan   0-->no 0-->no    Q5 Suicide Intent with Specific Plan   0-->no 0-->no    Q6 Suicide Behavior (Lifetime) 0-->no 0-->no 1-->yes 0-->no    If yes to Q6, within past 3 months?   0-->no 0-->no    Level of Risk per Screen no risks indicated no risks indicated moderate risk moderate risk    1. Wish to be Dead (Lifetime)     N   2. Non-Specific Active Suicidal Thoughts (Lifetime)     N   3. Active Suicidal Ideation with any Methods (Not Plan) Without Intent to Act (Lifetime)    N    4. Active Suicidal Ideation with Some Intent to Act, Without Specific Plan (Lifetime)    N    5. Active Suicidal Ideation with Specific Plan and Intent (Lifetime)    N    Most Severe Ideation Rating (Lifetime)    1    Most Severe Ideation Rating (Past 1 Month)    1    Frequency (Lifetime)    1    Frequency (Past 1 Month)    1    Duration (Lifetime)    1    Duration (Past 1 Month)    1    Controllability (Lifetime)    1    Controllability (Past 1 Month)    1    Deterrents (Lifetime)    1    Deterrents (Past 1 Month)    1    Reasons for Ideation (Lifetime)    2    Reasons for Ideation (Past 1 Month)    2    Actual Attempt (Lifetime)    N N   Actual Attempt (Past 3 Months)    N    Has subject engaged in non-suicidal self-injurious behavior? (Lifetime)    Y Y   Has subject engaged in non-suicidal self-injurious behavior? (Past 3 Months)    N N   Interrupted Attempts (Lifetime)    N N   Interrupted Attempts (Past 3 Months)    N    Aborted or Self-Interrupted Attempt (Lifetime)    N N   Aborted or Self-Interrupted Attempt (Past 3 Months)    N    Preparatory Acts or Behavior (Lifetime)    N N   Preparatory Acts or Behavior (Past 3  Months)    N    Calculated C-SSRS Risk Score (Lifetime/Recent)    No Risk Indicated No Risk Indicated         ASSESSMENT: Current Emotional / Mental Status (status of significant symptoms):   Risk status (Self / Other harm or suicidal ideation)   Patient denies current fears or concerns for personal safety.   Patient denies current or recent suicidal ideation or behaviors.   Patient denies current or recent homicidal ideation or behaviors.   Patient denies current or recent self injurious behavior or ideation.   Patient denies other safety concerns.   Patient reports there has been no change in risk factors since their last session.     Patient reports there has been no change in protective factors since their last session.     Recommended that patient call 911 or go to the local ED should there be a change in any of these risk factors     Appearance:   Appropriate    Eye Contact:   Good    Psychomotor Behavior: Normal    Attitude:   Cooperative  Interested Friendly Pleasant   Orientation:   All   Speech    Rate / Production: Normal/ Responsive    Volume:  Normal    Mood:    Anxious  Normal   Affect:    Appropriate    Thought Content:  Clear    Thought Form:  Coherent  Logical    Insight:    Good  and Intellectual Insight     Medication Review:   No changes to current psychiatric medication(s)     Medication Compliance:   Yes     Changes in Health Issues:   None reported     Chemical Use Review:   Substance Use: Chemical use reviewed, no active concerns identified      Tobacco Use: No change in amount of tobacco use since last session.  Patient declined discussion at this time    Diagnosis:  1. ZOË (generalized anxiety disorder)    2. Adjustment disorder with depressed mood    R/O social anxiety    Collateral Reports Completed:   Not Applicable    PLAN: (Patient Tasks / Therapist Tasks / Other)  Write down two things to get done each day, check off when done   Check-in with self about how you are feeling, ask if you  are feeling overwhelmed if feeling irritated  Complete social anxiety assessment   Reschedule psychiatry appointment   Make an appointment to get hormone levels checked    Ronaldo De La Cruz, CHERRI                                                         ______________________________________________________________________    Individual Treatment Plan    Patient's Name: Neisha Montano  YOB: 2000    Date of Creation: 4/4/25  Date Treatment Plan Last Reviewed/Revised: 4/4/25    DSM5 Diagnoses: 300.02 (F41.1) Generalized Anxiety Disorder or Adjustment Disorders  309.0 (F43.21) With depressed mood  Psychosocial / Contextual Factors: CIS Female, mom, recent first child, in a committed relationship, house maker, and employed part time.   PROMIS (reviewed every 90 days):   PROMIS-10 Scores        1/27/2025     2:53 PM 1/31/2025    12:30 PM 3/12/2025     5:38 PM   PROMIS-10 Total Score w/o Sub Scores   PROMIS TOTAL - SUBSCORES 24  21  20        Patient-reported       Referral / Collaboration:  Referral to another professional/service is not indicated at this time..    Anticipated number of session for this episode of care: 6-9 sessions  Anticipation frequency of session: Biweekly  Anticipated Duration of each session: 38-52 minutes  Treatment plan will be reviewed in 90 days or when goals have been changed.       MeasurableTreatment Goal(s) related to diagnosis / functional impairment(s)  Goal 1: Patient will decrease her social anxiety, including when engaging with others, and worrying less about what others might be thinking about or judging her.      I will know I've met my goal when I am able to have a conversation without feeling my heart beat and not bottling up my emotions.      Objective #A (Patient Action)   Patient will identify and compliment positives at least 3 times daily to support positive self-image  identify thoughts, triggers, and  stressors which contribute to feelings of anxiety  use cognitive  strategies identified in therapy to challenge anxious thoughts  Identify negative self-talk and behaviors: challenge core beliefs, myths, and actions  Feel less fidgety, restless or slow in daily activities / interpersonal interactions.  Status: New - Date: 4/4/25      Intervention(s)  Therapist will assign homework related to target objective with the intent to promote pt autonomy and better manage MH.  Facilitate increase in self-awareness  Provide psycho-education on avoidance-anxiety cycle and anxiety symptomology   Teach/promote utilization of critical observation and thought challenging     Objective #B Asking for help and not bottle up emotions   Patient will identify patterns of escalation  (i.e. tightness in chest, flushed face, increased heart rate, clenched hands, etc.)  identify at least 3 techniques for intervening on the escalation  practice one mindfulness skill each day for 5-10 minutes  learn & utilize at least 3 assertive communication skills weekly.  Status: New - Date: 4/4/25      Intervention(s)  Therapist will assign homework related to target objective with the intent to promote pt autonomy and better manage MH.  Facilitate increase in self-awareness  Provide psycho-education on alternative communication styles and mindfulness techniques   Teach/promote utilization of anger management tools and self-advocating skills    Goal 2: Patient will foster internal-motivation and improve her focus/concentration to complete tasks one at a time instead of becoming overwhelmed.    I will know I've met my goal when I get tasks done without being irritated or annoyed.      Objective #A (Patient Action)    Status: New - Date: 4/4/25      Patient will identify and use 3 strategies to improve organization  identify 3 strategies to more effectively address stressors  Increase interest, engagement, and pleasure in doing things  Decrease frequency and intensity of feeling down, depressed, hopeless  Feel less  tired and more energy during the day   Identify negative self-talk and behaviors: challenge core beliefs, myths, and actions  Improve concentration, focus, and mindfulness in daily activities   identify two areas of life that you would like to have improved functioning.    Intervention(s)  Therapist will assign homework related to target objective with the intent to promote pt autonomy and better manage MH.  Facilitate increase in self-awareness  Provide psycho-education on organizational strategies and barrier identification  Teach/promote utilization of habit/routine building skills and goal setting tools    Objective #B get out and do things or be more active  Patient will participate in 3 activities to improve mood  attend and participate in social or recreational activities at least once a week  identify 3 strategies for improving mood  identify at least 3 self-care activities.    Status: New - Date: 4/4/25      Intervention(s)  Therapist will assign homework related to target objective with the intent to promote pt autonomy and better manage MH.  Facilitate increase in self-awareness  Provide psycho-education on opposite action  Teach/promote utilization of behavior activation    Patient has reviewed and agreed to the above plan.      Ronaldo De La Cruz, DIMITRIC  April 4, 2025

## 2025-04-29 NOTE — TELEPHONE ENCOUNTER
Writer received [employer] message requesting help to reschedule patient's med mgmt appt.     Writer called patient and helped pt reschedule her return med mgmt appt for 5/1 830AM.    Armida Hamm  Transition Clinic Coordinator  04/29/25 12:09 PM

## 2025-04-30 ENCOUNTER — TELEPHONE (OUTPATIENT)
Dept: BEHAVIORAL HEALTH | Facility: CLINIC | Age: 25
End: 2025-04-30
Payer: COMMERCIAL

## 2025-04-30 NOTE — TELEPHONE ENCOUNTER
Writer called patient to remind them of appointment scheduled for  5/1/2025 . Left message for patient.  Message stated appointment details and Transition Clinic number to call back with any questions or rescheduling needs.      Armida Hamm  Transition Clinic Coordinator  04/30/25 12:45 PM

## 2025-05-01 ENCOUNTER — VIRTUAL VISIT (OUTPATIENT)
Dept: BEHAVIORAL HEALTH | Facility: CLINIC | Age: 25
End: 2025-05-01
Payer: COMMERCIAL

## 2025-05-01 DIAGNOSIS — F41.1 GAD (GENERALIZED ANXIETY DISORDER): Primary | ICD-10-CM

## 2025-05-01 DIAGNOSIS — F41.8 POSTPARTUM ANXIETY: ICD-10-CM

## 2025-05-01 RX ORDER — BUSPIRONE HYDROCHLORIDE 10 MG/1
10 TABLET ORAL 2 TIMES DAILY
Qty: 60 TABLET | Refills: 3 | Status: SHIPPED | OUTPATIENT
Start: 2025-05-01 | End: 2025-05-01

## 2025-05-01 RX ORDER — BUSPIRONE HYDROCHLORIDE 10 MG/1
10 TABLET ORAL 2 TIMES DAILY
Qty: 60 TABLET | Refills: 3 | Status: SHIPPED | OUTPATIENT
Start: 2025-05-01

## 2025-05-01 ASSESSMENT — PAIN SCALES - GENERAL: PAINLEVEL_OUTOF10: NO PAIN (0)

## 2025-05-01 NOTE — PROGRESS NOTES
[facility]      Mental Health & Addiction Service Line    Transition Clinic: Psychiatry Progress Note  Medication Management              ASSESSMENT/PLAN    Medications:  Change:  Buspirone/Buspar 10 mg twice daily; decrease from 15 mg twice daily       Labs:  -None ordered      Therapy and or Non-pharmacological modalities:  -Continue individual therapy with CANELO [facility]      Sleep:  -Get 7+ hours per night  -Avoid screens 60 minutes prior to bedtime    Nutrition:  -Anti-inflammatory diet: fruits, veggies, grains, plant proteins, lean animal protein (sparingly), dairy (small amounts)  -Omega 3's + fiber: food = first choice, supplements = second choice  -Avoid excessive amounts of: refined sugars + carbs, fried + fast foods    Activity:  -30 to 90 minutes (doesn't have to be consecutive) most days of the week  -Aerobic + strength/weight bearing  -Yoga + meditation/deep breathing      Disposition:  -Has been advised of consultative Transition Clinic model    -Please follow up at the Transition Clinic for medication management in:  3 months         Summary of Clinical Impressions:    Neisha Montano is a 24 y/o female with a prior history of:  -Trauma/abuse  -Daily Cannabis Use  -Nicotine Use Disorder     She went to the Emergency Department on 1/25/2025 in the context of experiencing: psychosis symptoms   (delusions, bizarre behaviors = wandering around, confusion) and suicidal ideations while using substances:   alcohol + THC.     Additionally, Neisha had her first baby/son: Francois, 8 months ago.  She is constantly anxious, worried, overwhelmed   with taking care of his needs and is often fearful when away from him for to long.     She initiated care at the Transition Clinic on 2/3/2025 for the management of psychotropics/bridging until able to   establish long term outpatient psychiatric services.     Attended follow up in 2025 on: 3/11.    -----------------------    Neisha outlines she is making progress in  individual therapy, continues to take care of her 9-month-old son, and will also has started  a part-time job (FTE = casual).    She notes Buspar 15 mg twice a day is contributing to some adverse effects including: dizziness and feeling nauseous which tend to subside after approximately 15 minutes, however since she is now employed, wants to reduce the Buspar back to 10 mg twice a day to avoid these side effects occurring in the workplace.    Neisha's presentation is polite, conversational, and forward thinking about several upcoming summer plans.  At this time there are no immediate   safety concerns towards herself or others.          DSM-V and or working diagnosis:    1.  ZOË     2.  Post Partum anxiety     3.  History as above        Rule outs:     4.  MDD single episode versus recurrent with anxious distress      5.  Cannabis Use versus Cannabis Use Disorder              SAFETY EVALUATION:  Suicidal ideations:  -denies  Homicidal ideations:  -denies  Access to guns:   -none reported  Risk factors:  -hx of psychosis symptoms  Protective and mitigating factors:  -son, significant other  -no prior attempts  -willingness to engage in outpatient mental health symptoms  Risk assessment:  -low to medium         SAFETY PLAN:  -Has numbers of at least 1 family member + 1 friend in personal contact list  -Knows clinic number(s) + operation of regular business hours   -Reviewed to utilize 988 or text [place] to [phone] for mental health crisis  -Discussed to call 911 and or return to the nearest Emergency Department if unable to maintain safety of self or others (due to severity   of suicidal and or homicidal ideations)      PSYCHIATRIC HISTORY    Individual therapy or IOP:   -None reported      Suicide attempts:  -None reported      Inpatient psychiatric hospitalizations:  -None reported   -No hx of commitments      ECT:  -None reported         MEDICAL HISTORY  -The problem list was reviewed via the Electronic Medical Record  (EMR) prior to the appointment  -The patient denies any concerning physical and or medical symptoms during the interviewing process        MEDICATIONS      Current Outpatient Medications:     busPIRone (BUSPAR) 10 MG tablet, Take 1 tablet (10 mg) by mouth 2 times daily. for 14 days then increase 15 mg twice daily, Disp: 28 tablet, Rfl: 0    busPIRone (BUSPAR) 15 MG tablet, Take 1 tablet (15 mg) by mouth 2 times daily., Disp: 60 tablet, Rfl: 1    norethindrone (MICRONOR) 0.35 MG tablet, Take 1 tablet (0.35 mg) by mouth daily., Disp: 84 tablet, Rfl: 2      If a controlled substance is prescribed during today's appointment:    -The [place] Prescription Monitoring Program has been reviewed and there are no current concerns with: diversionary activity, early refill requests, and or obtaining the medication from multiple providers.        VITALS    BP Readings from Last 3 Encounters:   01/25/25 117/63   08/22/24 114/72   08/01/24 108/64       Pulse Readings from Last 3 Encounters:   01/25/25 62   08/22/24 106   08/01/24 81       Wt Readings from Last 3 Encounters:   08/22/24 64.7 kg (142 lb 11.2 oz)   08/01/24 65.3 kg (143 lb 14.4 oz)   07/11/24 68.7 kg (151 lb 6.4 oz)           SCALES        1/27/2025     2:34 PM 2/2/2025    10:43 PM 3/12/2025     5:36 PM   PHQ   PHQ-9 Total Score 9  12  8    Q9: Thoughts of better off dead/self-harm past 2 weeks Not at all Not at all Not at all       Patient-reported            4/12/2024    10:38 AM 12/26/2024     2:32 PM 1/27/2025     2:51 PM   ZOË-7 SCORE   Total Score  14 (moderate anxiety) 15 (severe anxiety)   Total Score 0 14  15        Patient-reported           SUBSTANCE USE      Prior use:  -Denies any history of alcohol, recreational, or illicit substance use resulting in: legal issues, c/d programming     See 1/25/2025 Emergency Department encounter for further details:  -substance use: alcohol + THC probably contributing to delusions + erratic behaviors              MENTAL  STATUS EXAMINATION    Appearance: Adequately Groomed, Attire Appropriate for the Season  General Behavior:  Cooperative, Direct Eye Contact  Speech: Fluent, Normal rate, Soft  Musculoskeletal:    -Gait not observed during t.h. 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  -Going through training    -Brother is getting  and I'm throwing AXS-[employer] soon    -Francois is crawling/cruising, pulling himself up  -Will be planning his 1st birthday party in a few months        PSYCHIATRIC ROS    Sleep:  -Easier to fall asleep  -Sometimes having vivid dreams in the middle of the night that sometimes wake me   -Also not has hard to get up up in the morning + I'm not napping as much in the AM    -Go to bed: 9:30 to 10:00 pm  -Get up: 6:30 am        Mood/Anxiety:  -See PHQ-9 score    -See ZOË-7 score    -Less overwhelmed, not ruminating as much, breaking tasks down into smaller parts has been helpful  -Some irritability around breastfeeding      Suicidal ideations:  -Denies passive or active thoughts at this time      SIB:  -Denies engaging in self harm       Side effects:  -Feel dizzy + nausea for 15 minutes after taking Buspar 15 mg BID  -It tends to go away after I lay down but now that I'm starting to work again, it would be hard to do that        VISIT INFORMATION    Date:  May 1, 2025       Number:  -3rd    Patient Identifying Information:  Legal name: Neisha OANH Montano  Preferred name: Neisha  : 2000    Participants:    -Patient  -Provider      Telehealth visit details:  Video     Start: 8:35 am  End:  8:55 am    Originating Location (pt. Location): Home  Distant Location (provider location):  On-site     Platform used for Video Visit: [employer]       Total time:   31 minutes per:    -Review of EMR including but not limited to: tabs within Chart Review + outside records if applicable   -Appointment time  -Documentation        Pat BAEZ-CNP, [facility]        ----------------------------------------------------------------------------------------------------------------------        TREATMENT RISK STATEMENT    The risks, benefits, alternatives, and potential adverse effects have been explained and are understood by the patient.  The patient agrees to the treatment plan with their ability to do so.      The patient knows to call the clinic: [phone]  for any problems or concerns until the next psychiatry visit, regardless if it is within or outside of the [employer] system.     If unable to reach clinic staff (via phone call or medical messaging) during the normal business hours: 8:00 am to 4:30 pm then it is recommended accessing the nearest: emergency department, urgent care facility, or utilizing local (varies based on county of residence) and national crisis #'s or text messaging services for immediate assistance.          ----------------------------------------------------------------------------------------------------------------------      If applicable the following has been discussed with the patient, parent/guardian, and or attending family member during the appointment:      Risks of polypharmacy and possible drug interactions with current medication list + common OTC products, herbs, and supplements.  Moving forward, it is suggested to intermittently check-in with a clinic or retail pharmacist whenever new medications or OTC/h/s are consumed.    Risks of polypharmacy and possible drug + drug interactions  with current medication list.  Moving forward, it is suggested to intermittently check-in with a clinic or retail pharmacist whenever new prescription medications are added to your treatment regimen.    Recommendation to adhere to CDC guidelines as it relates to alcohol consumption.  If taking benzodiazepines, you should abstain from alcohol intake due to increased risks of CNS and respiratory depression, as well as psychomotor impairment.    If possible, it is recommended to avoid concurrent use of prescribed: opioids + benzodiazepines due to increased risks of CNS and respiratory depression, as well as the increased risk of overdose.     Recommendation to minimize and or abstain from THC use (unless you are prescribed medical marijuana).    Recommendation to abstain from illicit substances including but not limited to the following: heroin, street fentanyl, cocaine, methamphetamines, bath salts, and other synthetic products.    Recommendation to abstain from tobacco/smoking/nicotine, alcohol, THC, and all illicit substances if trying to become or are pregnant.    Do not take opioids, stimulants, and or other prescription medications unless they are specifically prescribed for you.     Black Box Warnings associated with the prescribed psychotropic(s).     Potential adverse effects of antipsychotics including but not limited to the following: weight gain, metabolic syndrome, EPS/Tardive Dyskinesias, and Neuroleptic Malignant Syndrome.    Potential adverse effects of stimulants including but not limited to the following: sudden death, MI, stroke, HTN, cardiomyopathy (long term use), seizures, tiny, psychosis, and aggressive behaviors.

## 2025-05-01 NOTE — PATIENT INSTRUCTIONS
Change:  Buspirone/Buspar 10 mg twice daily; decrease from 15 mg twice daily     ----------------------------      -If there are questions/inquiries between now and the upcoming follow up appointment OR prior to transferring to the next level of care, please call or message the [facility] Psychiatry Department.    Clinic hours/phone number:   -Monday to Friday from 8:00 am to 4:30 pm  -[phone]      MyChart:  -IS NOT an emergency messaging service  -Should not be considered equivalent to text messages   -Please allow up to 3 business days for a reply   -If you do not receive a response within 3 business days, please do not hesitate to contact us again via calling (see above) or messaging to check on the status of your questions or concerns    -----------------------    Call:  -[phone] ([phone]) if guidance is needed after [facility] clinic hours about utilizing [facility] versus the Emergency Departments    ----------------------    Any time (during or after regular clinic hours) immediate and or life threatening symptoms occur (either medical or mental health), call:  [phone] and or go to the nearest Emergency Department      ---------------------    Please use the following websites to obtain a comprehensive list of all counties within the state [facility] for adult and child mental health crisis numbers:    https://[place].gov/dhs/people-we-serve/people-with-disabilities/health-care/adult-mental-health/resources/crisis-contacts.jsp    https://[place]/dhs/people-we-serve/people-with-disabilities/health-care/childrens-mental-health/resources/crisis-contacts.jsp      -------------------      Below is a list of county (also found on the above websites), state, and national crisis numbers.   These resources can provide real-time assistance if experiencing moderate to severe mental health symptoms.        Additionally, please visit your county website to find the most up-to-date list of local:  adult, child,  family, and chemical dependency services available.        [facility]  [phone]    [facility]  [phone]    [facility]  [phone]    [facility]  [phone]    [facility]  [phone]: Adults 18 and over    [phone]: Children 17 and under    [facility] ([facility]), Acute Psychiatric Services (APS) Assessment & Referral:   [phone]    Community Outreach for Psychiatric Emergencies (COPE):  [phone]    [facility]  [phone]: Adults 18 and over    [phone]: Children 17 and under      Walk-in crisis services are available Monday to Friday from 8 am to 5:30 pm at Urgent Care for  Adult metal health:    [address] [place] [address]  Closed on Saturday, Sunday, and holidays    [place]  [phone]: Toll free    [facility]  [phone]      [facility] [place]  [phone]: Toll free    Text: [place] to 335973      [facility] and human services  Call 211 or visit [email or web address].[email or web address] to obtain free and confidential h/hs information     [place] [facility]  If you are an adult needing support, you can talk to a specialist who has first hand experience living with a mental health condition:    [phone]    Text: Support to 74155    [facility] [place]:  domestic violence/LGBTQ+  566.646.4732 option 3    [employer]: LGBTQ+  3-623-811-0162    Text: START to 613168     Resources  6-574-JzlqUkh: (1-595.303.3701)    Crisis line: 1-509.595.7557    Text: 898829    [employer]: women and sexually exploited youth  868.581.1454    [employer] Domestic Violence Shelter Hotline  487.714.7513    National Suicide Prevention Lifeline  987    National Youth Crisis Hotline  711

## 2025-05-01 NOTE — NURSING NOTE
Current patient location: [address] [place] [address] E  [place] [address]    Is the patient currently in the state of [place]? YES    Visit mode: VIDEO    If the visit is dropped, the patient can be reconnected by:VIDEO VISIT: Text to cell phone:   Telephone Information:   Mobile [phone]    and VIDEO VISIT: Send to e-mail at: [email or web address]    Will anyone else be joining the visit? NO  (If patient encounters technical issues they should call [phone])    Are changes needed to the allergy or medication list? No    Are refills needed on medications prescribed by this physician? Discuss with provider Discus lowering dose of buspirone, per pt.     Rooming Documentation:  Questionnaire(s) completed    Reason for visit: RECHECK    Dottie OBRIEN

## 2025-05-13 ENCOUNTER — VIRTUAL VISIT (OUTPATIENT)
Dept: PSYCHOLOGY | Facility: CLINIC | Age: 25
End: 2025-05-13
Payer: COMMERCIAL

## 2025-05-13 DIAGNOSIS — F41.1 GAD (GENERALIZED ANXIETY DISORDER): Primary | ICD-10-CM

## 2025-05-13 DIAGNOSIS — F43.21 ADJUSTMENT DISORDER WITH DEPRESSED MOOD: ICD-10-CM

## 2025-05-13 PROCEDURE — 90837 PSYTX W PT 60 MINUTES: CPT | Mod: 95 | Performed by: COUNSELOR

## 2025-05-13 NOTE — PROGRESS NOTES
[facility]                                     Progress Note    Patient Name: Neisha Montano  Date: 5/13/25         Service Type: Individual      Session Start Time: 10:00  Session End Time: 11:04     Session Length: 64 minutes    Session #: 4    Attendees: Client attended alone    Service Modality:  Video Visit:      Provider verified identity through the following two step process.  Patient provided:  Patient is known previously to provider    Telemedicine Visit: The patient's condition can be safely assessed and treated via synchronous audio and visual telemedicine encounter.      Reason for Telemedicine Visit: Services only offered telehealth    Originating Site (Patient Location): Patient's home    Distant Site (Provider Location): Provider Remote Setting- Home Office    Consent:  The patient/guardian has verbally consented to: the potential risks and benefits of telemedicine (video visit) versus in person care; bill my insurance or make self-payment for services provided; and responsibility for payment of non-covered services.     Patient would like the video invitation sent by:  My Chart    Mode of Communication:  Video Conference via [facility]    Distant Location (Provider):  On-site    As the provider I attest to compliance with applicable laws and regulations related to telemedicine.    DATA  Interactive Complexity: No  Crisis: No  Extended Session (53+ minutes): PROLONGED SERVICE IN THE OUTPATIENT SETTING REQUIRING DIRECT (FACE-TO-FACE) PATIENT CONTACT BEYOND THE USUAL SERVICE:    - Patient's presenting concerns require more intensive intervention than could be completed within the usual service      Progress Since Last Session (Related to Symptoms / Goals / Homework):   Symptoms: Improving pt is feeling less overwhelmed    Homework: Achieved / completed to satisfaction      Episode of Care Goals: Satisfactory progress - ACTION (Actively working towards change); Intervened by reinforcing  "change plan / affirming steps taken     Current / Ongoing Stressors and Concerns:   Pt is currently seeking therapy due to ongoing stressors related to being a new parent and struggling with getting overwhelmed by tasks. Pt is about 9 months post-partum and recently returned to work part time. Pt noted that she is able to meet her parenting responsibilities as a new mom, but getting stuff done around the house is really hard, finding herself procrastinating and avoiding much of the time. Pt thinks it is less of an energy issues and more of a motivational one, and will find herself getting irritable at times as well when she can't get herself past this barrier, as this wasn't always the case. Since last session, pt reported things have been going fine and her transition back to work has been going okay. Pt has not been pushing herself to  a to many shifts, which has felt like a good choice, and yet is noticing that she is feeling more stressed. Pt only has two months left of pumping for breast milk, which she thinks will help with this a bit as well, and then she will be also going back in her birth control which includes estrogen which pt may impact how she is feeling. Pt did follow-up with her psychiatrist, agreeing to go back to a lower dose, which continues to feel effective without the side effects. Pt has kept up with her chore list, which continues to feel effective, and pt has noticed feeling more motivated to clean as well with her child getting older (ie more inquisitive). Pt also is thinking that she wants to implement a bit more intentional structure as well now, which she was encouraged to independently implement, and pt verbalized feeling more empowered to do so now. Pt thinks that having stuff to do every weekend has been helpful as it gives her something to look forward to as well. Pt is also trying to remind herself more frequently that \"things don't go as planned and that is okay or happens " "for reason\", falling back on her sense of spirituality to reinforce this. Pt confirmed that her frustration and irritability does still peculate a bit, especially when she is tired or feeling excessively warm, and yet it feels like it is going better. Communication with her partner has been going well overall and this past month she has been feeling more able to express herself more effectively with being heard. Pt also thinks that maybe the winter is just a harder time for them in general due to the cold and not being able to do as much outside the house as a family/couple. Pt also gave her partner credit for helping her more around the house with cleaning, which she appreciates. Pt denied any toxic communication patterns these past couple weeks and she has been doing better with not bottling things up. Pt did briefly talk about needing to work on setting boundaries and working on \"I statements\", focusing on how she is trying to apply this when talking with her older brother. 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 Pt has also been getting better sleep overall as well, though has noted that when she takes naps it can be harder to stay on a consistent schedule. Pt reviewed her social anxiety assessment, with pt noting that she doesn't avoid much. Pt did recount how she has noticed that when she does things she will get used to them and they will make her less anxious about doing it in the future, but getting herself to do it can be challenging. Pt identified that biggest barrier her anxiety tends to make her very risk adverse at times which can sometimes make her avoid or not be able to enjoy doing things in the moment. Session went longer then anticipated due to presenting concerns needing more time to address then anticipated.     Social Anxiety Assessment: Anxiety 28, Avoidance 13, total 41 (highest item was acting or performing in front of a crowd " 3/3).        Treatment Objective(s) Addressed in This Session:   identify and use 3 strategies to improve organization, participate in 3 activities to improve mood, identify at least 3 techniques for intervening on the escalation  identify patterns of escalation  (i.e. tightness in chest, flushed face, increased heart rate, clenched hands, etc.), use cognitive strategies identified in therapy to challenge anxious thoughts, Increase interest, engagement, and pleasure in doing things  Decrease frequency and intensity of feeling down, depressed, hopeless  Feel less tired and more energy during the day   Identify negative self-talk and behaviors: challenge core beliefs, myths, and actions  Improve concentration, focus, and mindfulness in daily activities   Feel less fidgety, restless or slow in daily activities / interpersonal interactions, identify 3 strategies for improving mood, identify two areas of life that you would like to have improved functioning, practice one mindfulness skill each day for 5-10 minutes, identify at least 3 self-care activities       Intervention:   CBT: Reviewed how her thinking has impacted   Motivational Interviewing    MI Intervention: Expressed Empathy/Understanding, Supported Autonomy, Collaboration, Evocation, Open-ended questions, Reflections: simple and complex, Change talk (evoked), and Reframe     Change Talk Expressed by the Patient: Desire to change Ability to change Reasons to change Need to change Committment to change    Provider Response to Change Talk: E - Evoked more info from patient about behavior change, A - Affirmed patient's thoughts, decisions, or attempts at behavior change, R - Reflected patient's change talk, and S - Summarized patient's change talk statements    Psychodynamic: Processed through internal-experiences of being a new parents and past relationship challenges  Solution Focused: Identified immediate areas of concern and potential barrier to  success    Assessments completed prior to visit:  PHQ2:   Phq2 (   1999 [employer],All Rights Reserved. Used With Permission. Developed By Morgan Jean,Timbo Leahy And Colleagues,With An Educational Breanna From [employer].)    3/10/2025  8:57 PM CDT - Filed by Patient   The following questionnaire should only be answered by the patient. Are you the patient? Yes   Over the last 2 weeks, how often have you been bothered by any of the following problems?    Q1: Little interest or pleasure in doing things Several days   Q2: Feeling down, depressed or hopeless Several days   PHQ2 (   1999 [employer],ALL RIGHTS RESERVED. USED WITH PERMISSION. DEVELOPED BY MORGAN JEAN,MEGHA VÁSQUEZ,TIMBO URIBE AND COLLEAGUES,WITH AN EDUCATIONAL BREANNA FROM [employer].)    PHQ-2 Score (range: 0 - 6) 2       PHQ9:       9/9/2014     8:30 AM 4/12/2024    10:38 AM 12/26/2024     2:32 PM 1/27/2025     2:34 PM 2/2/2025    10:43 PM 3/12/2025     5:36 PM   PHQ-9 SCORE   PHQ-9 Total Score 0        PHQ-9 Total Score MyChart   7 (Mild depression) 9 (Mild depression) 12 (Moderate depression) 8 (Mild depression)   PHQ-9 Total Score  2 7  9  12  8        Patient-reported     GAD2:       1/27/2025     2:51 PM 1/31/2025    12:29 PM 3/10/2025     8:57 PM 3/12/2025     5:37 PM 4/29/2025     9:57 AM 5/13/2025     9:33 AM   ZOË-2   Feeling nervous, anxious, or on edge 3 3 1 1 0 0   Not being able to stop or control worrying 3 3 0 0 0 0   ZOË-2 Total Score 6  6  1  1  0  0        Patient-reported     GAD7:       4/12/2024    10:38 AM 12/26/2024     2:32 PM 1/27/2025     2:51 PM   ZOË-7 SCORE   Total Score  14 (moderate anxiety) 15 (severe anxiety)   Total Score 0 14  15        Patient-reported     CAGE-AID:       1/27/2025     2:54 PM   CAGE-AID Total Score   Total Score 1    Total Score MyChart 1 (A total score of 2 or greater is considered clinically significant)       Patient-reported     [place] Suicide  Severity Rating Scale (Lifetime/Recent)      7/8/2024     6:08 PM 7/10/2024     3:05 AM 1/25/2025     5:27 AM 1/25/2025     8:48 AM 3/13/2025     1:47 PM   [place] Suicide Severity Rating (Lifetime/Recent)   Q1 Wish to be Dead (Lifetime)    Yes    Q2 Non-Specific Active Suicidal Thoughts (Lifetime)    No    Q1 Wished to be Dead (Past Month) 0-->no 0-->no 1-->yes 1-->yes    Q2 Suicidal Thoughts (Past Month) 0-->no 0-->no 1-->yes 0-->no    Q3 Suicidal Thought Method   0-->no 0-->no    Q4 Suicidal Intent without Specific Plan   0-->no 0-->no    Q5 Suicide Intent with Specific Plan   0-->no 0-->no    Q6 Suicide Behavior (Lifetime) 0-->no 0-->no 1-->yes 0-->no    If yes to Q6, within past 3 months?   0-->no 0-->no    Level of Risk per Screen no risks indicated no risks indicated moderate risk moderate risk    1. Wish to be Dead (Lifetime)     N   2. Non-Specific Active Suicidal Thoughts (Lifetime)     N   3. Active Suicidal Ideation with any Methods (Not Plan) Without Intent to Act (Lifetime)    N    4. Active Suicidal Ideation with Some Intent to Act, Without Specific Plan (Lifetime)    N    5. Active Suicidal Ideation with Specific Plan and Intent (Lifetime)    N    Most Severe Ideation Rating (Lifetime)    1    Most Severe Ideation Rating (Past 1 Month)    1    Frequency (Lifetime)    1    Frequency (Past 1 Month)    1    Duration (Lifetime)    1    Duration (Past 1 Month)    1    Controllability (Lifetime)    1    Controllability (Past 1 Month)    1    Deterrents (Lifetime)    1    Deterrents (Past 1 Month)    1    Reasons for Ideation (Lifetime)    2    Reasons for Ideation (Past 1 Month)    2    Actual Attempt (Lifetime)    N N   Actual Attempt (Past 3 Months)    N    Has subject engaged in non-suicidal self-injurious behavior? (Lifetime)    Y Y   Has subject engaged in non-suicidal self-injurious behavior? (Past 3 Months)    N N   Interrupted Attempts (Lifetime)    N N   Interrupted Attempts (Past 3 Months)    N     Aborted or Self-Interrupted Attempt (Lifetime)    N N   Aborted or Self-Interrupted Attempt (Past 3 Months)    N    Preparatory Acts or Behavior (Lifetime)    N N   Preparatory Acts or Behavior (Past 3 Months)    N    Calculated C-SSRS Risk Score (Lifetime/Recent)    No Risk Indicated No Risk Indicated         ASSESSMENT: Current Emotional / Mental Status (status of significant symptoms):   Risk status (Self / Other harm or suicidal ideation)   Patient denies current fears or concerns for personal safety.   Patient denies current or recent suicidal ideation or behaviors.   Patient denies current or recent homicidal ideation or behaviors.   Patient denies current or recent self injurious behavior or ideation.   Patient denies other safety concerns.   Patient reports there has been no change in risk factors since their last session.     Patient reports there has been no change in protective factors since their last session.     Recommended that patient call 911 or go to the local ED should there be a change in any of these risk factors     Appearance:   Appropriate    Eye Contact:   Good    Psychomotor Behavior: Normal    Attitude:   Cooperative  Interested Friendly Pleasant   Orientation:   All   Speech    Rate / Production: Normal/ Responsive Talkative    Volume:  Normal    Mood:    Anxious  Normal   Affect:    Appropriate    Thought Content:  Clear    Thought Form:  Coherent  Logical    Insight:    Good , Intellectual Insight, and Spiritual insight     Medication Review:   No changes to current psychiatric medication(s)     Medication Compliance:   Yes     Changes in Health Issues:   None reported     Chemical Use Review:   Substance Use: Chemical use reviewed, no active concerns identified      Tobacco Use: No change in amount of tobacco use since last session.  Patient declined discussion at this time    Diagnosis:  1. ZOË (generalized anxiety disorder)    2. Adjustment disorder with depressed mood    R/O  social anxiety (possibly sub-clinical)    Collateral Reports Completed:   Not Applicable    PLAN: (Patient Tasks / Therapist Tasks / Other)  Keep writing down two things to get done each day, check off when done   Try implementing some additional structured time in your day  Review communication resources sent through [facility]  Check-in with self about how you are feeling, ask if you are feeling overwhelmed if feeling irritated    Ronaldo De La Cruz, [facility]                                                         ______________________________________________________________________    Individual Treatment Plan    Patient's Name: Neisha Montano  YOB: 2000    Date of Creation: 4/4/25  Date Treatment Plan Last Reviewed/Revised: 4/4/25    DSM5 Diagnoses: 300.02 (F41.1) Generalized Anxiety Disorder or Adjustment Disorders  309.0 (F43.21) With depressed mood  Psychosocial / Contextual Factors: CIS Female, mom, recent first child, in a committed relationship, house maker, and employed part time.   PROMIS (reviewed every 90 days):   PROMIS-10 Scores        1/27/2025     2:53 PM 1/31/2025    12:30 PM 3/12/2025     5:38 PM   PROMIS-10 Total Score w/o Sub Scores   PROMIS TOTAL - SUBSCORES 24  21  20        Patient-reported       Referral / Collaboration:  Referral to another professional/service is not indicated at this time..    Anticipated number of session for this episode of care: 6-9 sessions  Anticipation frequency of session: Biweekly  Anticipated Duration of each session: 38-52 minutes  Treatment plan will be reviewed in 90 days or when goals have been changed.       MeasurableTreatment Goal(s) related to diagnosis / functional impairment(s)  Goal 1: Patient will decrease her social anxiety, including when engaging with others, and worrying less about what others might be thinking about or judging her.      I will know I've met my goal when I am able to have a conversation without feeling my heart beat and not  bottling up my emotions.      Objective #A (Patient Action)   Patient will identify and compliment positives at least 3 times daily to support positive self-image  identify thoughts, triggers, and  stressors which contribute to feelings of anxiety  use cognitive strategies identified in therapy to challenge anxious thoughts  Identify negative self-talk and behaviors: challenge core beliefs, myths, and actions  Feel less fidgety, restless or slow in daily activities / interpersonal interactions.  Status: New - Date: 4/4/25     Intervention(s)  Therapist will assign homework related to target objective with the intent to promote pt autonomy and better manage MH.  Facilitate increase in self-awareness  Provide psycho-education on avoidance-anxiety cycle and anxiety symptomology   Teach/promote utilization of critical observation and thought challenging     Objective #B Asking for help and not bottle up emotions   Patient will identify patterns of escalation  (i.e. tightness in chest, flushed face, increased heart rate, clenched hands, etc.)  identify at least 3 techniques for intervening on the escalation  practice one mindfulness skill each day for 5-10 minutes  learn & utilize at least 3 assertive communication skills weekly.  Status: New - Date: 4/4/25     Intervention(s)  Therapist will assign homework related to target objective with the intent to promote pt autonomy and better manage MH.  Facilitate increase in self-awareness  Provide psycho-education on alternative communication styles and mindfulness techniques   Teach/promote utilization of anger management tools and self-advocating skills    Goal 2: Patient will foster internal-motivation and improve her focus/concentration to complete tasks one at a time instead of becoming overwhelmed.    I will know I've met my goal when I get tasks done without being irritated or annoyed.      Objective #A (Patient Action)    Status: New - Date: 4/4/25     Patient will  identify and use 3 strategies to improve organization  identify 3 strategies to more effectively address stressors  Increase interest, engagement, and pleasure in doing things  Decrease frequency and intensity of feeling down, depressed, hopeless  Feel less tired and more energy during the day   Identify negative self-talk and behaviors: challenge core beliefs, myths, and actions  Improve concentration, focus, and mindfulness in daily activities   identify two areas of life that you would like to have improved functioning.    Intervention(s)  Therapist will assign homework related to target objective with the intent to promote pt autonomy and better manage MH.  Facilitate increase in self-awareness  Provide psycho-education on organizational strategies and barrier identification  Teach/promote utilization of habit/routine building skills and goal setting tools    Objective #B get out and do things or be more active  Patient will participate in 3 activities to improve mood  attend and participate in social or recreational activities at least once a week  identify 3 strategies for improving mood  identify at least 3 self-care activities.    Status: New - Date: 4/4/25     Intervention(s)  Therapist will assign homework related to target objective with the intent to promote pt autonomy and better manage MH.  Facilitate increase in self-awareness  Provide psycho-education on opposite action  Teach/promote utilization of behavior activation    Patient has reviewed and agreed to the above plan.      Ronaldo De La Cruz, [facility]  April 4, 2025

## 2025-07-09 ENCOUNTER — VIRTUAL VISIT (OUTPATIENT)
Dept: PSYCHOLOGY | Facility: CLINIC | Age: 25
End: 2025-07-09
Payer: COMMERCIAL

## 2025-07-09 DIAGNOSIS — F41.1 GAD (GENERALIZED ANXIETY DISORDER): Primary | ICD-10-CM

## 2025-07-09 DIAGNOSIS — F43.21 ADJUSTMENT DISORDER WITH DEPRESSED MOOD: ICD-10-CM

## 2025-07-09 PROCEDURE — 90834 PSYTX W PT 45 MINUTES: CPT | Mod: 95 | Performed by: COUNSELOR
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
## 2025-08-06 ENCOUNTER — TELEPHONE (OUTPATIENT)
Dept: BEHAVIORAL HEALTH | Facility: CLINIC | Age: 25
End: 2025-08-06
Payer: COMMERCIAL

## 2025-08-06 ENCOUNTER — VIRTUAL VISIT (OUTPATIENT)
Dept: PSYCHOLOGY | Facility: CLINIC | Age: 25
End: 2025-08-06
Payer: COMMERCIAL

## 2025-08-06 DIAGNOSIS — F41.1 GAD (GENERALIZED ANXIETY DISORDER): Primary | ICD-10-CM

## 2025-08-06 PROCEDURE — 90832 PSYTX W PT 30 MINUTES: CPT | Mod: 95 | Performed by: COUNSELOR

## 2025-08-25 ENCOUNTER — VIRTUAL VISIT (OUTPATIENT)
Dept: BEHAVIORAL HEALTH | Facility: CLINIC | Age: 25
End: 2025-08-25
Payer: COMMERCIAL

## 2025-08-25 DIAGNOSIS — F41.1 GAD (GENERALIZED ANXIETY DISORDER): ICD-10-CM

## 2025-08-25 PROCEDURE — 98006 SYNCH AUDIO-VIDEO EST MOD 30: CPT | Performed by: NURSE PRACTITIONER

## 2025-08-25 RX ORDER — BUSPIRONE HYDROCHLORIDE 10 MG/1
10 TABLET ORAL 2 TIMES DAILY
Qty: 180 TABLET | Refills: 1 | Status: SHIPPED | OUTPATIENT
Start: 2025-08-25

## 2025-08-26 ENCOUNTER — OFFICE VISIT (OUTPATIENT)
Dept: OBGYN | Facility: OTHER | Age: 25
End: 2025-08-26
Attending: NURSE PRACTITIONER
Payer: COMMERCIAL

## 2025-08-26 VITALS
BODY MASS INDEX: 22.15 KG/M2 | HEART RATE: 76 BPM | WEIGHT: 125 LBS | SYSTOLIC BLOOD PRESSURE: 102 MMHG | HEIGHT: 63 IN | DIASTOLIC BLOOD PRESSURE: 68 MMHG

## 2025-08-26 DIAGNOSIS — Z30.011 ENCOUNTER FOR INITIAL PRESCRIPTION OF CONTRACEPTIVE PILLS: Primary | ICD-10-CM

## 2025-08-26 PROBLEM — Z37.9 NORMAL LABOR: Status: RESOLVED | Noted: 2024-07-10 | Resolved: 2025-08-26

## 2025-08-26 PROBLEM — Z36.89 ENCOUNTER FOR TRIAGE IN PREGNANT PATIENT: Status: RESOLVED | Noted: 2024-07-08 | Resolved: 2025-08-26

## 2025-08-26 RX ORDER — NORGESTIMATE AND ETHINYL ESTRADIOL 0.25-0.035
1 KIT ORAL DAILY
Qty: 84 TABLET | Refills: 3 | Status: SHIPPED | OUTPATIENT
Start: 2025-08-26

## 2025-08-26 ASSESSMENT — PAIN SCALES - GENERAL: PAINLEVEL_OUTOF10: NO PAIN (0)
